# Patient Record
Sex: FEMALE | Race: WHITE | Employment: PART TIME | URBAN - METROPOLITAN AREA
[De-identification: names, ages, dates, MRNs, and addresses within clinical notes are randomized per-mention and may not be internally consistent; named-entity substitution may affect disease eponyms.]

---

## 2020-08-10 ENCOUNTER — OFFICE VISIT (OUTPATIENT)
Dept: CARDIOLOGY CLINIC | Age: 50
End: 2020-08-10
Payer: COMMERCIAL

## 2020-08-10 VITALS
HEIGHT: 66 IN | DIASTOLIC BLOOD PRESSURE: 82 MMHG | RESPIRATION RATE: 22 BRPM | SYSTOLIC BLOOD PRESSURE: 122 MMHG | OXYGEN SATURATION: 100 % | BODY MASS INDEX: 22.79 KG/M2 | HEART RATE: 71 BPM | WEIGHT: 141.8 LBS

## 2020-08-10 PROCEDURE — 99244 OFF/OP CNSLTJ NEW/EST MOD 40: CPT | Performed by: INTERNAL MEDICINE

## 2020-08-10 RX ORDER — LEVOTHYROXINE SODIUM 0.12 MG/1
TABLET ORAL
COMMUNITY
Start: 2020-07-20 | End: 2021-03-12 | Stop reason: SDUPTHER

## 2020-08-10 RX ORDER — ALBUTEROL SULFATE 90 UG/1
AEROSOL, METERED RESPIRATORY (INHALATION)
COMMUNITY
Start: 2020-08-03 | End: 2021-03-12 | Stop reason: SDUPTHER

## 2020-08-10 RX ORDER — METOPROLOL SUCCINATE 25 MG/1
25 TABLET, EXTENDED RELEASE ORAL DAILY
Qty: 30 TABLET | Refills: 5 | Status: ON HOLD | OUTPATIENT
Start: 2020-08-10 | End: 2021-01-15

## 2020-08-10 RX ORDER — ASPIRIN 81 MG/1
81 TABLET ORAL DAILY
COMMUNITY
End: 2020-12-14 | Stop reason: ALTCHOICE

## 2020-08-10 ASSESSMENT — ENCOUNTER SYMPTOMS
VOICE CHANGE: 0
ANAL BLEEDING: 0
CHEST TIGHTNESS: 0
COLOR CHANGE: 0
BLOOD IN STOOL: 0
APNEA: 0
ABDOMINAL DISTENTION: 0
WHEEZING: 0
DIARRHEA: 0
SHORTNESS OF BREATH: 0
VOMITING: 0
TROUBLE SWALLOWING: 0
NAUSEA: 0
FACIAL SWELLING: 0

## 2020-08-10 NOTE — PROGRESS NOTES
[facility] CARDIOLOGY OFFICE CONSULT        Patient: Lennox Heimlich  YOB: 1970  MRN: [id number]    Chief Complaint:  Chief Complaint   Patient presents with   Jacquelin Castro Cardiologist     Referred by Dr. Terence Phelan from [address] [employer]    Abnormal Test Results     ABN EKG       Subjective/HPI:   8/10/2020: Patient presents today for evaluation of dizziness and reportedly abnormal EKG. 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 She works for the [employer]. Physically very active. . No children. 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 But has not been doing any for the last 6 months because of COVID has been getting the shaky spells and dizziness. Never had syncope. No headaches no seizures no premonitory symptoms. No definite relationship to posture. She golf's occasionally. Does not smoke. Drinks 1 cup of coffee and 1 glass of red wine occasionally. No illicit drug abuse. She says her  complains of her snoring. Still has regular menstrual periods. I think she has mild vasovagal symptoms. Start her on Toprol 25 mg at night we will set up for stress Cardiolite echo and then see me in the [facility] office. She lives in [place]              No past medical history on file. No past surgical history on file. No family history on file.     Social History     Socioeconomic History    Marital status:      Spouse name: None    Number of children: None    Years of education: None    Highest education level: None   Occupational History    None   Social Needs    Financial resource strain: None    Food insecurity     Worry: None     Inability: None    Transportation needs     Medical: None     Non-medical: None   Tobacco Use    Smoking status: Current Some Day Smoker    Smokeless tobacco: Never Used   Substance and Sexual Activity    Alcohol use: Not Currently     Comment: occass    Drug use: Never    Sexual activity: Not Currently   Lifestyle    Physical activity     Days per week: None     Minutes per session: None    Stress: None   Relationships    Social connections     Talks on phone: None     Gets together: None     Attends Sikhism service: None     Active member of club or organization: None     Attends meetings of clubs or organizations: None     Relationship status: None    Intimate partner violence     Fear of current or ex partner: None     Emotionally abused: None     Physically abused: None     Forced sexual activity: None   Other Topics Concern    None   Social History Narrative    None       Allergies   Allergen Reactions    Azithromycin Other (See Comments)     BRUISING    Clarithromycin      Other reaction(s): Unknown  Causes Bruises    Molds & Smuts      Other reaction(s): Intolerance    Seasonal      Other reaction(s): Intolerance       Current Outpatient Medications   Medication Sig Dispense Refill    levothyroxine (SYNTHROID) 125 MCG tablet Take 1 tablet by mouth once daily.  albuterol sulfate  (90 Base) MCG/ACT inhaler INHALE 2 (TWO) puffs EVERY 4 HOURS AS NEEDED      Multiple Vitamin (MVI, CELEBRATE, CHEWABLE TABLET) Take 1 tablet by mouth daily      aspirin EC 81 MG EC tablet Take 81 mg by mouth daily      metoprolol succinate (TOPROL XL) 25 MG extended release tablet Take 1 tablet by mouth daily 30 tablet 5     No current facility-administered medications for this visit. Review of Systems:   Review of Systems   Constitutional: Negative for activity change, appetite change, diaphoresis, fatigue and unexpected weight change. HENT: Negative for facial swelling, nosebleeds, trouble swallowing and voice change. Respiratory: Negative for apnea, chest tightness, shortness of breath and wheezing. Cardiovascular: Negative for chest pain, palpitations and leg swelling. Gastrointestinal: Negative for abdominal distention, anal bleeding, blood in stool, diarrhea, nausea and vomiting.    Genitourinary: Negative for decreased urine volume and dysuria. Musculoskeletal: Negative for gait problem, myalgias, neck pain and neck stiffness. Skin: Negative for color change, pallor, rash and wound. Neurological: Negative for dizziness, seizures, syncope, facial asymmetry, weakness, light-headedness, numbness and headaches. Hematological: Does not bruise/bleed easily. Psychiatric/Behavioral: Negative for agitation, behavioral problems, confusion, hallucinations and suicidal ideas. The patient is not nervous/anxious. All other systems reviewed and are negative. Review of System is negative except for as mentioned above. Physical Examination:    /82 (Site: Right Upper Arm, Position: Sitting, Cuff Size: Medium Adult)   Pulse 71   Resp 22   Ht 5' 6\" (1.676 m)   Wt 141 lb 12.8 oz (64.3 kg)   SpO2 100%   BMI 22.89 kg/m²    Physical Exam   Constitutional: She appears healthy. No distress. HENT:   Nose: Nose normal.   Mouth/Throat: Dentition is normal. Oropharynx is clear. Eyes: Pupils are equal, round, and reactive to light. Conjunctivae are normal.   Neck: Normal range of motion and thyroid normal. Neck supple. Cardiovascular: Regular rhythm, S1 normal, S2 normal, normal heart sounds, intact distal pulses and normal pulses. PMI is not displaced. No murmur heard. Pulmonary/Chest: She has no wheezes. She has no rales. She exhibits no tenderness. Abdominal: Soft. Bowel sounds are normal. She exhibits no distension and no mass. There is no splenomegaly or hepatomegaly. There is no abdominal tenderness. No hernia. Neurological: She is alert and oriented to person, place, and time. She has normal motor skills. Gait normal.   Skin: Skin is warm and dry. No cyanosis. No jaundice. Nails show no clubbing. There is no problem list on file for this patient.         Orders Placed This Encounter   Procedures    NM Myocardial Spect Rest Exercise or Rx     Standing Status:   Future     Standing Expiration Date: 8/10/2021     Scheduling Instructions:      TO BE DONE AT [facility]     Order Specific Question:   Reason for Exam?     Answer:   Angina     Order Specific Question:   Procedure Type     Answer:   Exercise     Order Specific Question:   Reason for exam:     Answer:   ANGINA    Echo 2D w doppler w color complete     Standing Status:   Future     Standing Expiration Date:   8/10/2021     Scheduling Instructions:      TO BE DONE AT [facility] ASAP     Order Specific Question:   Reason for exam:     Answer:   ANGINA           Orders Placed This Encounter   Medications    metoprolol succinate (TOPROL XL) 25 MG extended release tablet     Sig: Take 1 tablet by mouth daily     Dispense:  30 tablet     Refill:  5           Assessment:    1. Angina at rest [facility])         Plan:   Patient to have Echo and Stress Cardiolite test done. Started on Toprol 25mg Nightly. Stay on same medications. See me in 2-3 weeks after testing. This note was partially generated using Dragon voice recognition system, and there may be some incorrect words, spellings, punctuation that were not noticed in checking the note before saving.         Electronically signed by Caio Arriaza MD on 8/10/2020 at 1:30 PM

## 2020-08-31 ENCOUNTER — APPOINTMENT (OUTPATIENT)
Dept: NON INVASIVE DIAGNOSTICS | Age: 50
End: 2020-08-31
Payer: COMMERCIAL

## 2020-08-31 ENCOUNTER — HOSPITAL ENCOUNTER (OUTPATIENT)
Dept: NON INVASIVE DIAGNOSTICS | Age: 50
Discharge: HOME | End: 2020-08-31
Payer: COMMERCIAL

## 2020-08-31 ENCOUNTER — HOSPITAL ENCOUNTER (OUTPATIENT)
Dept: NUCLEAR MEDICINE | Age: 50
Discharge: HOME | End: 2020-09-02
Payer: COMMERCIAL

## 2020-08-31 PROCEDURE — 93017 CV STRESS TEST TRACING ONLY: CPT

## 2020-08-31 PROCEDURE — 3430000000 HC RX DIAGNOSTIC RADIOPHARMACEUTICAL: Performed by: INTERNAL MEDICINE

## 2020-08-31 PROCEDURE — A9502 TC99M TETROFOSMIN: HCPCS | Performed by: INTERNAL MEDICINE

## 2020-08-31 PROCEDURE — 2580000003 HC RX 258: Performed by: INTERNAL MEDICINE

## 2020-08-31 PROCEDURE — 78452 HT MUSCLE IMAGE SPECT MULT: CPT

## 2020-08-31 RX ORDER — SODIUM CHLORIDE 0.9 % (FLUSH) 0.9 %
10 SYRINGE (ML) INJECTION PRN
Status: DISCONTINUED | OUTPATIENT
Start: 2020-08-31 | End: 2020-09-03 | Stop reason: HOSPADM

## 2020-08-31 RX ADMIN — TETROFOSMIN 11.7 MILLICURIE: 1.38 INJECTION, POWDER, LYOPHILIZED, FOR SOLUTION INTRAVENOUS at 08:33

## 2020-08-31 RX ADMIN — Medication 10 ML: at 09:57

## 2020-08-31 RX ADMIN — TETROFOSMIN 31.8 MILLICURIE: 1.38 INJECTION, POWDER, LYOPHILIZED, FOR SOLUTION INTRAVENOUS at 09:56

## 2020-08-31 RX ADMIN — Medication 10 ML: at 08:33

## 2020-08-31 NOTE — PROGRESS NOTES
Hx,allergies and medications reviewed. Jada 93 here. Injected patient with isotope and Myoview. Tolerated procedure very well. Reached 87 % target HR. SOB reported. Returned to baseline in recovery. Denied chest pain or pressure. EKG shows occasional pvc's and noted t wave abnormality on initial ekg.

## 2020-09-02 NOTE — PROCEDURES
[address]                      [address], [address] [facility]                              CARDIAC STRESS TEST    PATIENT NAME: Manasa Carroll                      :        1970  MED REC NO:   [id number]                            ROOM:  ACCOUNT NO:   [de-identified]                           ADMIT DATE: 2020  PROVIDER:     James Dickson DO    CARDIOVASCULAR DIAGNOSTIC DEPARTMENT    DATE OF STUDY:  2020    ONE-DAY TREADMILL MYOCARDIAL PERFUSION STRESS TEST    ORDERING PROVIDER:  Tariq Estrada MD    PRIMARY CARE PROVIDER:  Vlad Miles MD    REASON FOR EXAM:  Angina. EXAM TYPE:  Stress Myocardial Perfusion Treadmill, 1-day. PROCEDURE DESCRIPTION:  The patient was injected intravenously at rest  with technetium-99m tetrofosmin followed by resting SPECT myocardial  perfusion imaging and then underwent stress protocol on a treadmill. The patient was then injected intravenously at maximal exercise with  technetium-99m tetrofosmin and SPECT myocardial perfusion and gated  imaging were repeated. Rest dose:  11.7 mCi  Stress dose:  31.8 mCi    FINDINGS:  The stress and rest images exhibit homogeneous uptake of  tracer throughout the left ventricular myocardium. There is no evidence  of stress-induced reversible perfusion abnormalities to suggest  myocardial ischemia. Gated imaging exhibits normal left ventricular  size and normal wall motion and myocardial thickening. The patient did  not develop any symptoms other than fatigue during the procedure. LVEF:  75%  TID ratio:  1.02    The resting EKG is abnormal with sinus rhythm and poor R-wave  progression across the precordial leads. There are ST depressions in  the inferior leads as well as anteroseptal myocardial infarction of  questionable age indeterminate. IMPRESSION:  Abnormal resting EKG.         Jennifer Gonzalez DO    D: 2020 #8:00:45       T: 2020 8:19:04

## 2020-09-14 ENCOUNTER — OFFICE VISIT (OUTPATIENT)
Dept: CARDIOLOGY CLINIC | Age: 50
End: 2020-09-14
Payer: COMMERCIAL

## 2020-09-14 VITALS
WEIGHT: 144.6 LBS | OXYGEN SATURATION: 99 % | BODY MASS INDEX: 23.24 KG/M2 | HEIGHT: 66 IN | SYSTOLIC BLOOD PRESSURE: 122 MMHG | HEART RATE: 82 BPM | RESPIRATION RATE: 22 BRPM | DIASTOLIC BLOOD PRESSURE: 80 MMHG

## 2020-09-14 DIAGNOSIS — Z00.00 WELL ADULT HEALTH CHECK: ICD-10-CM

## 2020-09-14 LAB
BASOPHILS ABSOLUTE: 0 K/UL (ref 0–0.2)
BASOPHILS RELATIVE PERCENT: 0.6 %
EOSINOPHILS ABSOLUTE: 0 K/UL (ref 0–0.7)
EOSINOPHILS RELATIVE PERCENT: 0.3 %
FOLLICLE STIMULATING HORMONE: 38 MIU/ML
HCT VFR BLD CALC: 42.5 % (ref 37–47)
HEMOGLOBIN: 14 G/DL (ref 12–16)
LUTEINIZING HORMONE: 42.6 MIU/ML
LYMPHOCYTES ABSOLUTE: 2.9 K/UL (ref 1–4.8)
LYMPHOCYTES RELATIVE PERCENT: 36 %
MCH RBC QN AUTO: 29.2 PG (ref 27–31.3)
MCHC RBC AUTO-ENTMCNC: 33 % (ref 33–37)
MCV RBC AUTO: 88.7 FL (ref 82–100)
MONOCYTES ABSOLUTE: 0.5 K/UL (ref 0.2–0.8)
MONOCYTES RELATIVE PERCENT: 6.8 %
NEUTROPHILS ABSOLUTE: 4.5 K/UL (ref 1.4–6.5)
NEUTROPHILS RELATIVE PERCENT: 56.3 %
PDW BLD-RTO: 13.8 % (ref 11.5–14.5)
PLATELET # BLD: 312 K/UL (ref 130–400)
PROLACTIN: 10.9 NG/ML
RBC # BLD: 4.79 M/UL (ref 4.2–5.4)
T3 TOTAL: 0.84 NG/ML (ref 0.8–2)
T4 TOTAL: 8.9 UG/DL (ref 4.5–11.7)
TSH SERPL DL<=0.05 MIU/L-ACNC: 2.44 UIU/ML (ref 0.44–3.86)
WBC # BLD: 7.9 K/UL (ref 4.8–10.8)

## 2020-09-14 PROCEDURE — 99213 OFFICE O/P EST LOW 20 MIN: CPT | Performed by: INTERNAL MEDICINE

## 2020-09-14 ASSESSMENT — ENCOUNTER SYMPTOMS
CHEST TIGHTNESS: 0
BLOOD IN STOOL: 0
APNEA: 0
SHORTNESS OF BREATH: 0
NAUSEA: 0
DIARRHEA: 0
VOMITING: 0

## 2020-09-14 NOTE — PROGRESS NOTES
status: Current Some Day Smoker    Smokeless tobacco: Never Used   Substance and Sexual Activity    Alcohol use: Not Currently     Comment: occass    Drug use: Never    Sexual activity: Not Currently   Lifestyle    Physical activity     Days per week: None     Minutes per session: None    Stress: None   Relationships    Social connections     Talks on phone: None     Gets together: None     Attends Orthodox service: None     Active member of club or organization: None     Attends meetings of clubs or organizations: None     Relationship status: None    Intimate partner violence     Fear of current or ex partner: None     Emotionally abused: None     Physically abused: None     Forced sexual activity: None   Other Topics Concern    None   Social History Narrative    None       Allergies   Allergen Reactions    Azithromycin Other (See Comments)     BRUISING    Clarithromycin      Other reaction(s): Unknown  Causes Bruises    Molds & Smuts      Other reaction(s): Intolerance    Seasonal      Other reaction(s): Intolerance       Current Outpatient Medications   Medication Sig Dispense Refill    levothyroxine (SYNTHROID) 125 MCG tablet Take 1 tablet by mouth once daily.  albuterol sulfate  (90 Base) MCG/ACT inhaler INHALE 2 (TWO) puffs EVERY 4 HOURS AS NEEDED      Multiple Vitamin (MVI, CELEBRATE, CHEWABLE TABLET) Take 1 tablet by mouth daily      aspirin EC 81 MG EC tablet Take 81 mg by mouth daily      metoprolol succinate (TOPROL XL) 25 MG extended release tablet Take 1 tablet by mouth daily 30 tablet 5     No current facility-administered medications for this visit. Review of Systems:   Review of Systems   Constitutional: Negative for appetite change, diaphoresis and fatigue. HENT: Negative for nosebleeds. Respiratory: Negative for apnea, chest tightness and shortness of breath. Cardiovascular: Negative for chest pain, palpitations and leg swelling.    Gastrointestinal: Negative for blood in stool, diarrhea, nausea and vomiting. Musculoskeletal: Negative for myalgias, neck pain and neck stiffness. Skin: Negative for color change, pallor, rash and wound. Neurological: Negative for dizziness, seizures, syncope, weakness, light-headedness, numbness and headaches. Hematological: Does not bruise/bleed easily. Psychiatric/Behavioral: Negative for agitation, behavioral problems and confusion. The patient is not nervous/anxious and is not hyperactive. All other systems reviewed and are negative. Review of System is negative except for as mentioned above. Physical Examination:    /80 (Site: Right Upper Arm, Position: Sitting, Cuff Size: Medium Adult)   Pulse 82   Resp 22   Ht 5' 6\" (1.676 m)   Wt 144 lb 9.6 oz (65.6 kg)   LMP 09/12/2020 (Approximate)   SpO2 99%   BMI 23.34 kg/m²    Physical Exam   Constitutional: She appears healthy. HENT:   Nose: Nose normal.   Mouth/Throat: Dentition is normal. Oropharynx is clear. Eyes: Pupils are equal, round, and reactive to light. Neck: Normal range of motion. Cardiovascular: Normal rate, regular rhythm, S1 normal, S2 normal, normal heart sounds, intact distal pulses and normal pulses. No extrasystoles are present. Exam reveals no gallop. No murmur heard. Pulmonary/Chest: Effort normal and breath sounds normal. She has no wheezes. She has no rales. She exhibits no tenderness. Abdominal: Soft. Bowel sounds are normal. She exhibits no distension and no mass. There is no splenomegaly or hepatomegaly. There is no abdominal tenderness. Musculoskeletal: Normal range of motion. General: No tenderness, deformity or edema. Neurological: She is alert and oriented to person, place, and time. She has normal motor skills and normal reflexes. Gait normal.   Skin: Skin is warm and dry. There is no problem list on file for this patient.           Orders Placed This Encounter   Procedures    CBC Auto Differential     Standing Status:   Future     Number of Occurrences:   1     Standing Expiration Date:   9/14/2021    T3     Standing Status:   Future     Number of Occurrences:   1     Standing Expiration Date:   9/14/2021    T4     Standing Status:   Future     Number of Occurrences:   1     Standing Expiration Date:   9/14/2021    TSH Without Reflex     Standing Status:   Future     Number of Occurrences:   1     Standing Expiration Date:   9/14/2021    Prolactin     Standing Status:   Future     Number of Occurrences:   1     Standing Expiration Date:   9/57/4095    Follicle Stimulating Hormone     Standing Status:   Future     Number of Occurrences:   1     Standing Expiration Date:   9/14/2021    Luteinizing Hormone     Standing Status:   Future     Number of Occurrences:   1     Standing Expiration Date:   9/14/2021         No orders of the defined types were placed in this encounter. Assessment:    1. Well adult health check    2. Angina at rest [facility])         Plan:     Patient to have Arterial Doppler ultrasound of both legs. Need labs drawn for CBC, FSH, LH,PROLACTIN, TSH, T3, T4. Will HOLD Toprol at this time until sees me again. Stay on same medications. See me in 5-7 weeks. This note was partially generated using Dragon voice recognition system, and there may be some incorrect words, spellings, punctuation that were not noticed in checking the note before saving.         Electronically signed by Maryann Del Toro MD on 9/16/2020 at 10:53 AM

## 2020-09-16 ASSESSMENT — ENCOUNTER SYMPTOMS: COLOR CHANGE: 0

## 2020-10-05 ENCOUNTER — TELEPHONE (OUTPATIENT)
Dept: CARDIOLOGY CLINIC | Age: 50
End: 2020-10-05

## 2020-10-19 ENCOUNTER — OFFICE VISIT (OUTPATIENT)
Dept: CARDIOLOGY CLINIC | Age: 50
End: 2020-10-19
Payer: COMMERCIAL

## 2020-10-19 VITALS
SYSTOLIC BLOOD PRESSURE: 122 MMHG | BODY MASS INDEX: 23.24 KG/M2 | DIASTOLIC BLOOD PRESSURE: 80 MMHG | RESPIRATION RATE: 22 BRPM | WEIGHT: 144.6 LBS | HEIGHT: 66 IN | HEART RATE: 87 BPM | OXYGEN SATURATION: 99 %

## 2020-10-19 PROCEDURE — 90686 IIV4 VACC NO PRSV 0.5 ML IM: CPT | Performed by: INTERNAL MEDICINE

## 2020-10-19 PROCEDURE — 90471 IMMUNIZATION ADMIN: CPT | Performed by: INTERNAL MEDICINE

## 2020-10-19 PROCEDURE — 99214 OFFICE O/P EST MOD 30 MIN: CPT | Performed by: INTERNAL MEDICINE

## 2020-10-19 ASSESSMENT — ENCOUNTER SYMPTOMS
CHEST TIGHTNESS: 0
STRIDOR: 0
NAUSEA: 0
APNEA: 0
VOMITING: 0
COUGH: 0
WHEEZING: 0
SHORTNESS OF BREATH: 0
BLOOD IN STOOL: 0
DIARRHEA: 0

## 2020-10-19 NOTE — PROGRESS NOTES
[facility] CARDIOLOGY OFFICE FOLLOW-UP      Patient: Ivette Mccoy  YOB: 1970  MRN: [id number]    Chief Complaint:  Chief Complaint   Patient presents with    1 Month Follow-Up    Chest Pain    Discuss Labs         Subjective/HPI:  10/19/2020: Patient presents today for follow-up of vague chest discomfort. Stress is negative. She works for the [employer]. Also teaches exercise classes. She had series of blood tests done which were ordered by me. I am going to set her up with . She has not had a good female exam.  Needs complete checkup. She we will see her next week. See me in 3 months       9/14/2020: Patient presents today for follow-up of dizziness. She still feels little bit weak. She is having her menstrual.  And says that usually happens around the time. Stress echo was negative. I went to order a series of blood tests to check hormones. See me after     8/10/2020: Patient presents today for evaluation of dizziness and reportedly abnormal EKG. 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  She works for the [employer]. [employer] very active. [place] Jose.  No children.  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 Khadijah Huerta has not been doing any for the last 6 months because of COVID has been getting the shaky spells and dizziness. Never had syncope.  No headaches no seizures no premonitory symptoms.  No definite relationship to posture.  She golf's occasionally.  Does not smoke.  Drinks 1 cup of coffee and 1 glass of red wine occasionally.  No illicit drug abuse.  She says her  complains of her snoring.  Still has regular menstrual periods.  I think she has mild vasovagal symptoms.  Start her on Toprol 25 mg at night we will set up for stress Cardiolite echo and then see me in the [facility] office.  She lives in [place]        No past medical history on file. No past surgical history on file. No family history on file.     Social History     Socioeconomic History    Marital status:  No current facility-administered medications for this visit. Review of Systems:   Review of Systems   Constitutional: Negative for diaphoresis and fatigue. HENT: Negative for nosebleeds. Respiratory: Negative for apnea, cough, chest tightness, shortness of breath, wheezing and stridor. Cardiovascular: Negative for chest pain, palpitations and leg swelling. Gastrointestinal: Negative for blood in stool, diarrhea, nausea and vomiting. Musculoskeletal: Negative for myalgias, neck pain and neck stiffness. Neurological: Negative for dizziness, seizures, syncope, weakness, light-headedness, numbness and headaches. Hematological: Does not bruise/bleed easily. Psychiatric/Behavioral: Negative for confusion and suicidal ideas. The patient is not nervous/anxious. All other systems reviewed and are negative. Review of System is negative except for as mentioned above. Physical Examination:    /80 (Site: Right Upper Arm, Position: Sitting, Cuff Size: Medium Adult)   Pulse 87   Resp 22   Ht 5' 6\" (1.676 m)   Wt 144 lb 9.6 oz (65.6 kg)   LMP 10/10/2020 (Approximate)   SpO2 99%   BMI 23.34 kg/m²    Physical Exam   Constitutional: She appears healthy. No distress. HENT:   Nose: Nose normal.   Mouth/Throat: Dentition is normal. Oropharynx is clear. Eyes: Pupils are equal, round, and reactive to light. Conjunctivae are normal.   Neck: Normal range of motion and thyroid normal. Neck supple. Cardiovascular: Regular rhythm, S1 normal, S2 normal, normal heart sounds, intact distal pulses and normal pulses. PMI is not displaced. No murmur heard. Pulmonary/Chest: She has no wheezes. She has no rales. She exhibits no tenderness. Abdominal: Soft. Bowel sounds are normal. She exhibits no distension and no mass. There is no splenomegaly or hepatomegaly. There is no abdominal tenderness. No hernia. Neurological: She is alert and oriented to person, place, and time.  She has normal motor skills. Gait normal.   Skin: Skin is warm and dry. No cyanosis. No jaundice. Nails show no clubbing. There is no problem list on file for this patient. Orders Placed This Encounter   Procedures    INFLUENZA, QUADV, 3 YRS AND OLDER, IM PF, PREFILL SYR OR SDV, 0.5ML (Gerhardt Bowling, PF)   Karthik Connolly MD, Primary Care, [facility]     Referral Priority:   Routine     Referral Type:   Eval and Treat     Referral Reason:   Specialty Services Required     Referred to Provider: Cailin Jay MD     Requested Specialty:   Internal Medicine     Number of Visits Requested:   1         No orders of the defined types were placed in this encounter. Assessment:    1. Encounter to establish care with new doctor    2. Need for influenza vaccination    3. Chest pain, unspecified          Plan:   Referred to Dr. Cailin Jay at [facility] PCP    Gave Influenza vaccine    Stay on same medications. See me in 3 months. This note was partially generated using Dragon voice recognition system, and there may be some incorrect words, spellings, punctuation that were not noticed in checking the note before saving.         Electronically signed by Howard Walters MD on 10/20/2020 at 9:29 AM

## 2020-10-26 ENCOUNTER — OFFICE VISIT (OUTPATIENT)
Dept: PRIMARY CARE CLINIC | Age: 50
End: 2020-10-26
Payer: COMMERCIAL

## 2020-10-26 VITALS
WEIGHT: 145 LBS | DIASTOLIC BLOOD PRESSURE: 74 MMHG | OXYGEN SATURATION: 98 % | HEART RATE: 76 BPM | RESPIRATION RATE: 16 BRPM | BODY MASS INDEX: 23.3 KG/M2 | HEIGHT: 66 IN | SYSTOLIC BLOOD PRESSURE: 124 MMHG

## 2020-10-26 DIAGNOSIS — R42 VERTIGO: ICD-10-CM

## 2020-10-26 DIAGNOSIS — N95.1 PERIMENOPAUSAL: ICD-10-CM

## 2020-10-26 LAB
ESTRADIOL LEVEL: 126 PG/ML
VITAMIN B-12: 598 PG/ML (ref 232–1245)

## 2020-10-26 PROCEDURE — 99205 OFFICE O/P NEW HI 60 MIN: CPT | Performed by: INTERNAL MEDICINE

## 2020-10-26 RX ORDER — MECLIZINE HYDROCHLORIDE 25 MG/1
25 TABLET ORAL 3 TIMES DAILY PRN
Qty: 30 TABLET | Refills: 0 | Status: SHIPPED | OUTPATIENT
Start: 2020-10-26 | End: 2020-12-14 | Stop reason: ALTCHOICE

## 2020-10-26 RX ORDER — FLUTICASONE PROPIONATE 50 MCG
2 SPRAY, SUSPENSION (ML) NASAL DAILY
Qty: 1 BOTTLE | Refills: 0 | Status: SHIPPED | OUTPATIENT
Start: 2020-10-26

## 2020-10-26 RX ORDER — AMOXICILLIN 500 MG/1
500 CAPSULE ORAL 3 TIMES DAILY
Qty: 21 CAPSULE | Refills: 0 | Status: SHIPPED | OUTPATIENT
Start: 2020-10-26 | End: 2020-11-02

## 2020-10-26 SDOH — ECONOMIC STABILITY: TRANSPORTATION INSECURITY
IN THE PAST 12 MONTHS, HAS THE LACK OF TRANSPORTATION KEPT YOU FROM MEDICAL APPOINTMENTS OR FROM GETTING MEDICATIONS?: NO

## 2020-10-26 SDOH — ECONOMIC STABILITY: INCOME INSECURITY: HOW HARD IS IT FOR YOU TO PAY FOR THE VERY BASICS LIKE FOOD, HOUSING, MEDICAL CARE, AND HEATING?: SOMEWHAT HARD

## 2020-10-26 SDOH — ECONOMIC STABILITY: FOOD INSECURITY: WITHIN THE PAST 12 MONTHS, THE FOOD YOU BOUGHT JUST DIDN'T LAST AND YOU DIDN'T HAVE MONEY TO GET MORE.: NEVER TRUE

## 2020-10-26 SDOH — ECONOMIC STABILITY: FOOD INSECURITY: WITHIN THE PAST 12 MONTHS, YOU WORRIED THAT YOUR FOOD WOULD RUN OUT BEFORE YOU GOT MONEY TO BUY MORE.: NEVER TRUE

## 2020-10-26 SDOH — ECONOMIC STABILITY: TRANSPORTATION INSECURITY
IN THE PAST 12 MONTHS, HAS LACK OF TRANSPORTATION KEPT YOU FROM MEETINGS, WORK, OR FROM GETTING THINGS NEEDED FOR DAILY LIVING?: NO

## 2020-10-26 ASSESSMENT — PATIENT HEALTH QUESTIONNAIRE - PHQ9
2. FEELING DOWN, DEPRESSED OR HOPELESS: 0
1. LITTLE INTEREST OR PLEASURE IN DOING THINGS: 0
SUM OF ALL RESPONSES TO PHQ QUESTIONS 1-9: 0
SUM OF ALL RESPONSES TO PHQ9 QUESTIONS 1 & 2: 0

## 2020-10-26 NOTE — PROGRESS NOTES
week: Not on file     Minutes per session: Not on file    Stress: Not on file   Relationships    Social connections     Talks on phone: Not on file     Gets together: Not on file     Attends Yarsanism service: Not on file     Active member of club or organization: Not on file     Attends meetings of clubs or organizations: Not on file     Relationship status: Not on file    Intimate partner violence     Fear of current or ex partner: Not on file     Emotionally abused: Not on file     Physically abused: Not on file     Forced sexual activity: Not on file   Other Topics Concern    Not on file   Social History Narrative    Not on file     History reviewed. No pertinent family history. Allergies:  Azithromycin; Clarithromycin; Molds & smuts; and Seasonal  There is no problem list on file for this patient. Current Outpatient Medications on File Prior to Visit   Medication Sig Dispense Refill    levothyroxine (SYNTHROID) 125 MCG tablet Take 1 tablet by mouth once daily.  albuterol sulfate  (90 Base) MCG/ACT inhaler INHALE 2 (TWO) puffs EVERY 4 HOURS AS NEEDED      Multiple Vitamin (MVI, CELEBRATE, CHEWABLE TABLET) Take 1 tablet by mouth daily      aspirin EC 81 MG EC tablet Take 81 mg by mouth daily      metoprolol succinate (TOPROL XL) 25 MG extended release tablet Take 1 tablet by mouth daily (Patient not taking: Reported on 10/26/2020) 30 tablet 5     No current facility-administered medications on file prior to visit. Review of Systems   Constitutional: Negative for chills, diaphoresis, fatigue and fever. HENT: Positive for sinus pressure. Negative for congestion, ear discharge, ear pain, rhinorrhea, sinus pain, sneezing and sore throat. Respiratory: Positive for cough. Negative for shortness of breath and wheezing. Cardiovascular: Negative for chest pain. Gastrointestinal: Negative for abdominal pain, diarrhea, nausea and vomiting.    Endocrine: Negative for cold intolerance and heat intolerance. Genitourinary: Negative for dysuria and frequency. Neurological: Positive for dizziness and headaches. Negative for facial asymmetry, speech difficulty, weakness and light-headedness. Psychiatric/Behavioral: Positive for sleep disturbance. Negative for dysphoric mood and suicidal ideas. The patient is not nervous/anxious. Objective:   /74 (Site: Left Upper Arm, Position: Sitting, Cuff Size: Medium Adult)   Pulse 76   Resp 16   Ht 5' 6\" (1.676 m)   Wt 145 lb (65.8 kg)   LMP 10/10/2020 (Approximate)   SpO2 98%   BMI 23.40 kg/m²     Physical Exam  Constitutional:       General: She is not in acute distress. Appearance: Normal appearance. She is well-developed. Cardiovascular:      Rate and Rhythm: Normal rate and regular rhythm. Pulses: Normal pulses. Heart sounds: Normal heart sounds. No murmur. Pulmonary:      Effort: Pulmonary effort is normal. No respiratory distress. Breath sounds: Normal breath sounds. No wheezing. Abdominal:      General: Bowel sounds are normal. There is no distension. Palpations: Abdomen is soft. There is no mass. Tenderness: There is no abdominal tenderness. There is no guarding or rebound. Neurological:      Mental Status: She is alert. Psychiatric:         Mood and Affect: Mood normal.       Assessment:       Diagnosis Orders   1. Vertigo  Vitamin B12    Methylmalonic Acid, Serum    meclizine (ANTIVERT) 25 MG tablet   2. Chronic sinusitis, unspecified location  fluticasone (FLONASE) 50 MCG/ACT nasal spray    amoxicillin (AMOXIL) 500 MG capsule   3. Family history of colon cancer  Tara Morrison MD, Gastroenterology, [place]   4. Insomnia, unspecified type  doxyLAMINE succinate (GNP SLEEP AID) 25 MG tablet   5. Breast cancer screening by mammogram  CARI DIGITAL SCREEN W OR WO CAD BILATERAL   6. Perimenopausal  Estradiol   7.  Screening for colon cancer  Isaac Blankenship MD, Gastroenterology, [place]

## 2020-10-27 ASSESSMENT — ENCOUNTER SYMPTOMS
DIARRHEA: 0
SORE THROAT: 0
COUGH: 1
ABDOMINAL PAIN: 0
SHORTNESS OF BREATH: 0
SINUS PRESSURE: 1
VOMITING: 0
NAUSEA: 0
RHINORRHEA: 0
SINUS PAIN: 0
WHEEZING: 0

## 2020-10-30 LAB — METHYLMALONIC ACID: 0.15 UMOL/L (ref 0–0.4)

## 2020-11-09 ENCOUNTER — OFFICE VISIT (OUTPATIENT)
Dept: PRIMARY CARE CLINIC | Age: 50
End: 2020-11-09
Payer: COMMERCIAL

## 2020-11-09 VITALS
DIASTOLIC BLOOD PRESSURE: 74 MMHG | OXYGEN SATURATION: 98 % | BODY MASS INDEX: 22.63 KG/M2 | HEART RATE: 76 BPM | WEIGHT: 144.2 LBS | RESPIRATION RATE: 16 BRPM | HEIGHT: 67 IN | SYSTOLIC BLOOD PRESSURE: 124 MMHG

## 2020-11-09 DIAGNOSIS — Z01.419 ENCOUNTER FOR GYNECOLOGICAL EXAMINATION: ICD-10-CM

## 2020-11-09 PROCEDURE — 99396 PREV VISIT EST AGE 40-64: CPT | Performed by: INTERNAL MEDICINE

## 2020-11-09 PROCEDURE — 99214 OFFICE O/P EST MOD 30 MIN: CPT | Performed by: INTERNAL MEDICINE

## 2020-11-09 ASSESSMENT — PATIENT HEALTH QUESTIONNAIRE - PHQ9
SUM OF ALL RESPONSES TO PHQ QUESTIONS 1-9: 0
SUM OF ALL RESPONSES TO PHQ9 QUESTIONS 1 & 2: 0
SUM OF ALL RESPONSES TO PHQ QUESTIONS 1-9: 0
SUM OF ALL RESPONSES TO PHQ QUESTIONS 1-9: 0
1. LITTLE INTEREST OR PLEASURE IN DOING THINGS: 0
2. FEELING DOWN, DEPRESSED OR HOPELESS: 0

## 2020-11-09 ASSESSMENT — ENCOUNTER SYMPTOMS
VOMITING: 0
WHEEZING: 0
NAUSEA: 0
SHORTNESS OF BREATH: 0
DIARRHEA: 0
ABDOMINAL PAIN: 0
COUGH: 0

## 2020-11-09 NOTE — PROGRESS NOTES
Subjective:      Patient ID: Deja Martinez is a 51 y.o. female    Pap and breast exams  Left elbow pain x 10 days  HPI   Patient presents for a pap test and breast exam today. Last pap test was at least 8 yrs and showed negative intraepithelial neoplasia with positive HPV and normal colposcopy. Denies hx of breast cancer in self or family. Possible cousin with cervical cancer. No vaginal bleed, discharge or itch. No breast lumps, pain or nipple discharge. Left elbow pain x 10 days   Left throbbing anterior left elbow pain since she tried to catch a falling child in her gym and heard a snap. Pain is relieved with ibuprofen and APAP. Assoc swelling and  inability to use the arm due to pain and weakness. History reviewed. No pertinent past medical history. History reviewed. No pertinent surgical history.   Social History     Socioeconomic History    Marital status:      Spouse name: Not on file    Number of children: Not on file    Years of education: Not on file    Highest education level: Not on file   Occupational History    Not on file   Social Needs    Financial resource strain: Somewhat hard    Food insecurity     Worry: Never true     Inability: Never true    Transportation needs     Medical: No     Non-medical: No   Tobacco Use    Smoking status: Current Some Day Smoker    Smokeless tobacco: Never Used   Substance and Sexual Activity    Alcohol use: Not Currently     Comment: occass    Drug use: Never    Sexual activity: Not Currently   Lifestyle    Physical activity     Days per week: Not on file     Minutes per session: Not on file    Stress: Not on file   Relationships    Social connections     Talks on phone: Not on file     Gets together: Not on file     Attends Bahai service: Not on file     Active member of club or organization: Not on file     Attends meetings of clubs or organizations: Not on file     Relationship status: Not on file    Intimate partner violence Fear of current or ex partner: Not on file     Emotionally abused: Not on file     Physically abused: Not on file     Forced sexual activity: Not on file   Other Topics Concern    Not on file   Social History Narrative    Not on file     History reviewed. No pertinent family history. Allergies:  Azithromycin; Clarithromycin; Molds & smuts; and Seasonal  There is no problem list on file for this patient. Current Outpatient Medications on File Prior to Visit   Medication Sig Dispense Refill    meclizine (ANTIVERT) 25 MG tablet Take 1 tablet by mouth 3 times daily as needed for Dizziness or Nausea 30 tablet 0    fluticasone (FLONASE) 50 MCG/ACT nasal spray 2 sprays by Nasal route daily 1 Bottle 0    doxyLAMINE succinate (GNP SLEEP AID) 25 MG tablet Take 1 tablet by mouth nightly as needed for Sleep 30 tablet 1    levothyroxine (SYNTHROID) 125 MCG tablet Take 1 tablet by mouth once daily.  albuterol sulfate  (90 Base) MCG/ACT inhaler INHALE 2 (TWO) puffs EVERY 4 HOURS AS NEEDED      Multiple Vitamin (MVI, CELEBRATE, CHEWABLE TABLET) Take 1 tablet by mouth daily      aspirin EC 81 MG EC tablet Take 81 mg by mouth daily      metoprolol succinate (TOPROL XL) 25 MG extended release tablet Take 1 tablet by mouth daily 30 tablet 5     No current facility-administered medications on file prior to visit. Review of Systems   Constitutional: Negative for chills, diaphoresis, fatigue and fever. HENT: Negative for congestion. Respiratory: Negative for cough, shortness of breath and wheezing. Cardiovascular: Negative for chest pain. Gastrointestinal: Negative for abdominal pain, diarrhea, nausea and vomiting. Endocrine: Negative for cold intolerance and heat intolerance. Genitourinary: Negative for dysuria and frequency. Musculoskeletal: Positive for arthralgias. Negative for myalgias. Neurological: Negative for dizziness and light-headedness.      Objective:   /74 (Site: Right

## 2020-11-13 LAB
HPV COMMENT: NORMAL
HPV TYPE 16: NOT DETECTED
HPV TYPE 18: NOT DETECTED
HPVOH (OTHER TYPES): NOT DETECTED

## 2020-12-07 ENCOUNTER — OFFICE VISIT (OUTPATIENT)
Dept: PRIMARY CARE CLINIC | Age: 50
End: 2020-12-07
Payer: COMMERCIAL

## 2020-12-07 VITALS
OXYGEN SATURATION: 98 % | SYSTOLIC BLOOD PRESSURE: 120 MMHG | WEIGHT: 149.8 LBS | HEART RATE: 72 BPM | BODY MASS INDEX: 23.51 KG/M2 | HEIGHT: 67 IN | DIASTOLIC BLOOD PRESSURE: 72 MMHG | RESPIRATION RATE: 18 BRPM

## 2020-12-07 PROCEDURE — 99214 OFFICE O/P EST MOD 30 MIN: CPT | Performed by: INTERNAL MEDICINE

## 2020-12-07 ASSESSMENT — ENCOUNTER SYMPTOMS
ABDOMINAL PAIN: 0
NAUSEA: 0
SHORTNESS OF BREATH: 0
VOMITING: 0
COUGH: 0
WHEEZING: 0
DIARRHEA: 0

## 2020-12-07 ASSESSMENT — PATIENT HEALTH QUESTIONNAIRE - PHQ9
SUM OF ALL RESPONSES TO PHQ QUESTIONS 1-9: 0
SUM OF ALL RESPONSES TO PHQ9 QUESTIONS 1 & 2: 0
SUM OF ALL RESPONSES TO PHQ QUESTIONS 1-9: 0
1. LITTLE INTEREST OR PLEASURE IN DOING THINGS: 0
2. FEELING DOWN, DEPRESSED OR HOPELESS: 0
SUM OF ALL RESPONSES TO PHQ QUESTIONS 1-9: 0

## 2020-12-07 NOTE — PROGRESS NOTES
Subjective:      Patient ID: Medina Kowalski is a 51 y.o. female  Follow up for elbow pain   HPI  Pt presents with persistent left elbow pain after an injury 5 weeks ago. Aggravated with movement, weakness persists. MRI denied, but planned for tomorrow. Await ortho appt next week. Pt is hopeful for insurance approval per ortho but loath to cancel MRI tomorrow. History reviewed. No pertinent past medical history. History reviewed. No pertinent surgical history. Social History     Socioeconomic History    Marital status:      Spouse name: Not on file    Number of children: Not on file    Years of education: Not on file    Highest education level: Not on file   Occupational History    Not on file   Social Needs    Financial resource strain: Somewhat hard    Food insecurity     Worry: Never true     Inability: Never true    Transportation needs     Medical: No     Non-medical: No   Tobacco Use    Smoking status: Current Some Day Smoker    Smokeless tobacco: Never Used   Substance and Sexual Activity    Alcohol use: Not Currently     Comment: occass    Drug use: Never    Sexual activity: Not Currently   Lifestyle    Physical activity     Days per week: Not on file     Minutes per session: Not on file    Stress: Not on file   Relationships    Social connections     Talks on phone: Not on file     Gets together: Not on file     Attends Voodoo service: Not on file     Active member of club or organization: Not on file     Attends meetings of clubs or organizations: Not on file     Relationship status: Not on file    Intimate partner violence     Fear of current or ex partner: Not on file     Emotionally abused: Not on file     Physically abused: Not on file     Forced sexual activity: Not on file   Other Topics Concern    Not on file   Social History Narrative    Not on file     History reviewed. No pertinent family history.   Allergies:  Azithromycin; Clarithromycin; Molds & smuts; and Seasonal  There is no problem list on file for this patient. Current Outpatient Medications on File Prior to Visit   Medication Sig Dispense Refill    meclizine (ANTIVERT) 25 MG tablet Take 1 tablet by mouth 3 times daily as needed for Dizziness or Nausea 30 tablet 0    fluticasone (FLONASE) 50 MCG/ACT nasal spray 2 sprays by Nasal route daily 1 Bottle 0    doxyLAMINE succinate (GNP SLEEP AID) 25 MG tablet Take 1 tablet by mouth nightly as needed for Sleep 30 tablet 1    levothyroxine (SYNTHROID) 125 MCG tablet Take 1 tablet by mouth once daily.  albuterol sulfate  (90 Base) MCG/ACT inhaler INHALE 2 (TWO) puffs EVERY 4 HOURS AS NEEDED      Multiple Vitamin (MVI, CELEBRATE, CHEWABLE TABLET) Take 1 tablet by mouth daily      aspirin EC 81 MG EC tablet Take 81 mg by mouth daily      metoprolol succinate (TOPROL XL) 25 MG extended release tablet Take 1 tablet by mouth daily 30 tablet 5     No current facility-administered medications on file prior to visit. Review of Systems   Constitutional: Negative for chills, diaphoresis, fatigue and fever. HENT: Negative for congestion, ear discharge and ear pain. Respiratory: Negative for cough, shortness of breath and wheezing. Cardiovascular: Negative for chest pain. Gastrointestinal: Negative for abdominal pain, diarrhea, nausea and vomiting. Endocrine: Negative for cold intolerance and heat intolerance. Genitourinary: Negative for dysuria and frequency. Musculoskeletal: Positive for arthralgias. Neurological: Negative for dizziness and light-headedness. Psychiatric/Behavioral: Negative for dysphoric mood. The patient is not nervous/anxious.       Objective:   /72 (Site: Right Upper Arm, Position: Sitting, Cuff Size: Medium Adult)   Pulse 72   Resp 18   Ht 5' 7\" (1.702 m)   Wt 149 lb 12.8 oz (67.9 kg)   LMP 11/24/2020 (Approximate)   SpO2 98%   BMI 23.46 kg/m²     Physical Exam  Constitutional:       General: She is not in acute distress. Appearance: Normal appearance. She is well-developed. Cardiovascular:      Rate and Rhythm: Normal rate and regular rhythm. Pulses: Normal pulses. Heart sounds: Normal heart sounds. Pulmonary:      Effort: Pulmonary effort is normal. No respiratory distress. Breath sounds: Normal breath sounds. Abdominal:      General: Bowel sounds are normal. There is no distension. Palpations: Abdomen is soft. There is no mass. Tenderness: There is no abdominal tenderness. There is no guarding or rebound. Musculoskeletal:      Comments: Left elbow in cast and sling      Neurological:      Mental Status: She is alert. Psychiatric:         Mood and Affect: Mood normal.         Behavior: Behavior normal.       Assessment:       Diagnosis Orders   1. Elbow injury, left, initial encounter      await ortho appt    2. Screening for hyperlipidemia  Lipid, Fasting   3. Preventative health care  HIV-1,2 Combo Ag/Ab By [facility], Reflexive Panel     Plan:      Orders Placed This Encounter   Procedures    Lipid, Fasting     Standing Status:   Future     Standing Expiration Date:   12/7/2021    HIV-1,2 Combo Ag/Ab By [facility], Reflexive Panel     Standing Status:   Future     Standing Expiration Date:   12/7/2021     No orders of the defined types were placed in this encounter. Return in about 1 month (around 1/7/2021) for follow up.

## 2020-12-14 ENCOUNTER — OFFICE VISIT (OUTPATIENT)
Dept: ORTHOPEDIC SURGERY | Age: 50
End: 2020-12-14
Payer: COMMERCIAL

## 2020-12-14 ENCOUNTER — HOSPITAL ENCOUNTER (OUTPATIENT)
Dept: ORTHOPEDIC SURGERY | Age: 50
Discharge: HOME | End: 2020-12-16
Payer: COMMERCIAL

## 2020-12-14 VITALS — WEIGHT: 149.8 LBS | BODY MASS INDEX: 23.51 KG/M2 | HEIGHT: 67 IN | TEMPERATURE: 97.6 F

## 2020-12-14 PROCEDURE — L3908 WHO COCK-UP NONMOLDE PRE OTS: HCPCS | Performed by: ORTHOPAEDIC SURGERY

## 2020-12-14 PROCEDURE — 73070 X-RAY EXAM OF ELBOW: CPT | Performed by: ORTHOPAEDIC SURGERY

## 2020-12-14 PROCEDURE — 73070 X-RAY EXAM OF ELBOW: CPT

## 2020-12-14 PROCEDURE — 99203 OFFICE O/P NEW LOW 30 MIN: CPT | Performed by: ORTHOPAEDIC SURGERY

## 2020-12-14 NOTE — PROGRESS NOTES
Subjective:      Patient ID: Pearl Carr is a 53 y.o. female who presents today for:  Chief Complaint   Patient presents with    Elbow Pain     NP-left elbow pain for 6 weeks, pt says that she had her arm out and a kid pulled on it and she heard a ripping sound, no other injury reported, hx of wrist injections years ago, no other surgical hx, pt is right hand dominant; no recent images       HPI  Patient complains of pain in her left elbow most noted with supination activities from the wrist and forearm. Patient works with gymnastics and as such spots frequently and is still performing activity with the elbow immobilized at 90 degrees with a wrist brace. Patient has been able to perform push-ups and do other exercise activities. Denies any numbness or tingling of her affected extremity. Patient's pain has been alleviated somewhat and is mild when it occurs mostly attributes weakness. No past medical history on file. No past surgical history on file.   Social History     Socioeconomic History    Marital status:      Spouse name: Not on file    Number of children: Not on file    Years of education: Not on file    Highest education level: Not on file   Occupational History    Not on file   Social Needs    Financial resource strain: Somewhat hard    Food insecurity     Worry: Never true     Inability: Never true    Transportation needs     Medical: No     Non-medical: No   Tobacco Use    Smoking status: Current Some Day Smoker    Smokeless tobacco: Never Used   Substance and Sexual Activity    Alcohol use: Not Currently     Comment: occass    Drug use: Never    Sexual activity: Not Currently   Lifestyle    Physical activity     Days per week: Not on file     Minutes per session: Not on file    Stress: Not on file   Relationships    Social connections     Talks on phone: Not on file     Gets together: Not on file     Attends Latter-day service: Not on file     Active member of club or organization: Not on file     Attends meetings of clubs or organizations: Not on file     Relationship status: Not on file    Intimate partner violence     Fear of current or ex partner: Not on file     Emotionally abused: Not on file     Physically abused: Not on file     Forced sexual activity: Not on file   Other Topics Concern    Not on file   Social History Narrative    Not on file     No family history on file. Allergies   Allergen Reactions    Azithromycin Other (See Comments)     BRUISING    Clarithromycin      Other reaction(s): Unknown  Causes Bruises    Molds & Smuts      Other reaction(s): Intolerance    Seasonal      Other reaction(s): Intolerance     Current Outpatient Medications on File Prior to Visit   Medication Sig Dispense Refill    fluticasone (FLONASE) 50 MCG/ACT nasal spray 2 sprays by Nasal route daily 1 Bottle 0    doxyLAMINE succinate (GNP SLEEP AID) 25 MG tablet Take 1 tablet by mouth nightly as needed for Sleep 30 tablet 1    levothyroxine (SYNTHROID) 125 MCG tablet Take 1 tablet by mouth once daily.  albuterol sulfate  (90 Base) MCG/ACT inhaler INHALE 2 (TWO) puffs EVERY 4 HOURS AS NEEDED      Multiple Vitamin (MVI, CELEBRATE, CHEWABLE TABLET) Take 1 tablet by mouth daily      metoprolol succinate (TOPROL XL) 25 MG extended release tablet Take 1 tablet by mouth daily 30 tablet 5     No current facility-administered medications on file prior to visit.           Review of Systems   General: Denies fever, chills  Cardiovascular: Denies chest pain  Pulmonary: Denies shortness of breath  GI: Denies nausea or vomiting  Neuro: Denies numbness or tingling      Objective:   Temp 97.6 °F (36.4 °C) (Temporal)   Ht 5' 7\" (1.702 m)   Wt 149 lb 12.8 oz (67.9 kg)   LMP 11/24/2020 (Approximate)   BMI 23.46 kg/m²     Ortho Exam   General: Well-appearing female who appears their stated age  Left upper extremity:  Skin: Intact circumferentially, no swelling, ecchymosis or edema

## 2020-12-23 ENCOUNTER — TELEPHONE (OUTPATIENT)
Dept: ORTHOPEDIC SURGERY | Age: 50
End: 2020-12-23

## 2020-12-23 NOTE — TELEPHONE ENCOUNTER
[facility] dept called stating that Pt's MRI has been sent for [id number] review. If you would like to do a [id number]-yc-bujw discussion vee inderjit is open 8:30a - 5p. Please advise.      [facility]   Tracking #: [id number]  P #: [phone]

## 2020-12-28 ENCOUNTER — HOSPITAL ENCOUNTER (OUTPATIENT)
Dept: MRI IMAGING | Age: 50
Discharge: HOME | End: 2020-12-30
Payer: COMMERCIAL

## 2020-12-28 PROCEDURE — 73221 MRI JOINT UPR EXTREM W/O DYE: CPT

## 2021-01-04 ENCOUNTER — OFFICE VISIT (OUTPATIENT)
Dept: ORTHOPEDIC SURGERY | Age: 51
End: 2021-01-04
Payer: COMMERCIAL

## 2021-01-04 VITALS
TEMPERATURE: 96.7 F | WEIGHT: 149 LBS | OXYGEN SATURATION: 98 % | HEIGHT: 67 IN | BODY MASS INDEX: 23.39 KG/M2 | HEART RATE: 76 BPM

## 2021-01-04 DIAGNOSIS — S46.212D BICEPS RUPTURE, DISTAL, LEFT, SUBSEQUENT ENCOUNTER: Primary | ICD-10-CM

## 2021-01-04 PROCEDURE — 99213 OFFICE O/P EST LOW 20 MIN: CPT | Performed by: ORTHOPAEDIC SURGERY

## 2021-01-04 NOTE — PROGRESS NOTES
Subjective:      Patient ID: Carollee Gosselin is a 53 y.o. female who presents today for:  Chief Complaint   Patient presents with    Results     pt here for MRI results of left elow, MRI Obtained 12/28, pt states she has some pain. HPI  Patient returns here today for follow-up on her left elbow. Patient states that she has had persistent pain in her left elbow as well as continued weakness. Patient has continued to do her job which does require significant supination and flexion activities of the left elbow. Patient did obtain MRI per my order and is here today for follow-up on that study. Patient denies any new injuries or trauma since that time    History reviewed. No pertinent past medical history. History reviewed. No pertinent surgical history.   Social History     Socioeconomic History    Marital status:      Spouse name: Not on file    Number of children: Not on file    Years of education: Not on file    Highest education level: Not on file   Occupational History    Not on file   Social Needs    Financial resource strain: Somewhat hard    Food insecurity     Worry: Never true     Inability: Never true    Transportation needs     Medical: No     Non-medical: No   Tobacco Use    Smoking status: Current Some Day Smoker    Smokeless tobacco: Never Used   Substance and Sexual Activity    Alcohol use: Not Currently     Comment: occass    Drug use: Never    Sexual activity: Not Currently   Lifestyle    Physical activity     Days per week: Not on file     Minutes per session: Not on file    Stress: Not on file   Relationships    Social connections     Talks on phone: Not on file     Gets together: Not on file     Attends Bahai service: Not on file     Active member of club or organization: Not on file     Attends meetings of clubs or organizations: Not on file     Relationship status: Not on file    Intimate partner violence     Fear of current or ex partner: Not on file Emotionally abused: Not on file     Physically abused: Not on file     Forced sexual activity: Not on file   Other Topics Concern    Not on file   Social History Narrative    Not on file     History reviewed. No pertinent family history. Allergies   Allergen Reactions    Azithromycin Other (See Comments)     BRUISING    Clarithromycin      Other reaction(s): Unknown  Causes Bruises    Molds & Smuts      Other reaction(s): Intolerance    Seasonal      Other reaction(s): Intolerance     Current Outpatient Medications on File Prior to Visit   Medication Sig Dispense Refill    fluticasone (FLONASE) 50 MCG/ACT nasal spray 2 sprays by Nasal route daily 1 Bottle 0    doxyLAMINE succinate (GNP SLEEP AID) 25 MG tablet Take 1 tablet by mouth nightly as needed for Sleep 30 tablet 1    levothyroxine (SYNTHROID) 125 MCG tablet Take 1 tablet by mouth once daily.  albuterol sulfate  (90 Base) MCG/ACT inhaler INHALE 2 (TWO) puffs EVERY 4 HOURS AS NEEDED      Multiple Vitamin (MVI, CELEBRATE, CHEWABLE TABLET) Take 1 tablet by mouth daily      metoprolol succinate (TOPROL XL) 25 MG extended release tablet Take 1 tablet by mouth daily 30 tablet 5     No current facility-administered medications on file prior to visit. Review of Systems  General: Denies fever, chills  Cardiovascular: Denies chest pain  Pulmonary: Denies shortness of breath  GI: Denies nausea or vomiting  Neuro: Denies numbness or tingling      Objective:   Pulse 76   Temp 96.7 °F (35.9 °C) (Temporal)   Ht 5' 7\" (1.702 m)   Wt 149 lb (67.6 kg)   SpO2 98%   BMI 23.34 kg/m²     Ortho Exam   Left upper extremity:  Skin: Intact circumferentially, no swelling, ecchymosis or edema is appreciated  Neuro: Sensation intact light touch in the radial, ulnar and median nerve distribution. Able to perform all cardinal hand movements. Vascular: Strong palpable radial pulse, brisk cap refill in all digits. MSK: Patient remains in elbow brace.

## 2021-01-11 ENCOUNTER — OFFICE VISIT (OUTPATIENT)
Dept: ORTHOPEDIC SURGERY | Age: 51
End: 2021-01-11
Payer: COMMERCIAL

## 2021-01-11 VITALS
BODY MASS INDEX: 23.39 KG/M2 | TEMPERATURE: 97.1 F | WEIGHT: 149 LBS | HEART RATE: 82 BPM | OXYGEN SATURATION: 99 % | HEIGHT: 67 IN

## 2021-01-11 DIAGNOSIS — S46.222A: Primary | ICD-10-CM

## 2021-01-11 PROCEDURE — 99214 OFFICE O/P EST MOD 30 MIN: CPT | Performed by: ORTHOPAEDIC SURGERY

## 2021-01-11 NOTE — PATIENT INSTRUCTIONS
We discussed with her that she is now 2 and half months out after a laceration distal biceps tendon, and usually this is not fixable, however the MRI reveals that the tendon is still out to length, and most likely is still reattachable without a tendon graft. We discussed that we should be ready to use a tendon graft. Discussed the procedure, as well as the risks extensively, nerve artery damage, injury to the posterior knowledges nerve, stiffness specifically in her case that might take some time to resolve, we discussed limited risk of infection. We also discussed the nonsurgical treatment, with weakness in flexion and supination. She would like to proceed.

## 2021-01-11 NOTE — PROGRESS NOTES
Subjective:      Patient ID: Abad Munguia is a 53 y.o. female who presents today for:  Chief Complaint   Patient presents with    Arm Injury     pt has seen Dr. Juan Caputo on 1/4/21,12/14/20, pt states she has pain and discomfort with movement, pt describes as ache       HPI:    Cameron Georges ruptured early November her left distal biceps tendon when she was catching someone in gymnastics. 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 History reviewed. No pertinent past medical history. History reviewed. No pertinent surgical history.   Social History     Socioeconomic History    Marital status:      Spouse name: Not on file    Number of children: Not on file    Years of education: Not on file    Highest education level: Not on file   Occupational History    Not on file   Social Needs    Financial resource strain: Somewhat hard    Food insecurity     Worry: Never true     Inability: Never true    Transportation needs     Medical: No     Non-medical: No   Tobacco Use    Smoking status: Current Some Day Smoker    Smokeless tobacco: Never Used   Substance and Sexual Activity    Alcohol use: Not Currently     Comment: occass    Drug use: Never    Sexual activity: Not Currently   Lifestyle    Physical activity     Days per week: Not on file     Minutes per session: Not on file    Stress: Not on file   Relationships    Social connections     Talks on phone: Not on file     Gets together: Not on file     Attends Worship service: Not on file     Active member of club or organization: Not on file     Attends meetings of clubs or organizations: Not on file     Relationship status: Not on file    Intimate partner violence     Fear of current or ex partner: Not on file     Emotionally abused: Not on file     Physically abused: Not on file     Forced sexual activity: Not on file   Other Topics Concern    Not on file   Social History Narrative    Not on file     History reviewed. No pertinent family history. Allergies   Allergen Reactions    Azithromycin Other (See Comments)     BRUISING    Clarithromycin      Other reaction(s): Unknown  Causes Bruises    Molds & Smuts      Other reaction(s): Intolerance    Seasonal      Other reaction(s): Intolerance     Current Outpatient Medications on File Prior to Visit   Medication Sig Dispense Refill    fluticasone (FLONASE) 50 MCG/ACT nasal spray 2 sprays by Nasal route daily 1 Bottle 0    doxyLAMINE succinate (GNP SLEEP AID) 25 MG tablet Take 1 tablet by mouth nightly as needed for Sleep 30 tablet 1    levothyroxine (SYNTHROID) 125 MCG tablet Take 1 tablet by mouth once daily.  albuterol sulfate  (90 Base) MCG/ACT inhaler INHALE 2 (TWO) puffs EVERY 4 HOURS AS NEEDED      Multiple Vitamin (MVI, CELEBRATE, CHEWABLE TABLET) Take 1 tablet by mouth daily      metoprolol succinate (TOPROL XL) 25 MG extended release tablet Take 1 tablet by mouth daily 30 tablet 5     No current facility-administered medications on file prior to visit. Acute and Chronic Pain Monitoring:   No flowsheet data found. Review of Systems  [1]    Objective--Physical Examination:     Pulse 82   Temp 97.1 °F (36.2 °C)   Ht 5' 7\" (1.702 m)   Wt 149 lb (67.6 kg)   SpO2 99%   BMI 23.34 kg/m²     Physical Examination:   Ortho Exam  Ms. Oxana Putnam is a lean 67-year-old woman, not impaired distress. The left elbow has a full range of motion with distal palpable biceps tendon. With supination and pronation there is no motion of the biceps, the bicep is slightly high riding. She is neurovascular intact. Radiographs and Laboratory Studies:     Diagnostic Imaging Studies:    MRI reveals a detached left distal biceps tendon, shortened approximately 2 cm. The tendon is likely at length.     Laboratory Studies:   Lab Results   Component Value Date    WBC 7.9 09/14/2020    HGB 14.0 09/14/2020    HCT 42.5 09/14/2020    MCV 88.7 09/14/2020     2020     No results found for: SEDRATE  No results found for: CRP      Procedures Performed Today:     [1]    Assessment / Plan:      Diagnosis Orders   1. Laceration of muscle, fascia and tendon of other parts of biceps, left arm, initial encounter        No orders of the defined types were placed in this encounter. No orders of the defined types were placed in this encounter. Patient Instructions   We discussed with her that she is now 2 and half months out after a laceration distal biceps tendon, and usually this is not fixable, however the MRI reveals that the tendon is still out to length, and most likely is still reattachable without a tendon graft. We discussed that we should be ready to use a tendon graft. Discussed the procedure, as well as the risks extensively, nerve artery damage, injury to the posterior knowledges nerve, stiffness specifically in her case that might take some time to resolve, we discussed limited risk of infection. We also discussed the nonsurgical treatment, with weakness in flexion and supination. She would like to proceed. No follow-ups on file.     Magalie Herman M.D., [address] [place] [address]  Orthopaedic Surgery       Surgery Phone: [facility]   Surgery Fax: [phone]    Phone: [phone]   PAT Fax: [phone]    Fax: [phone]    Orthopedics: Surgery Scheduling, PAT & PRE-OP Order Form  Call to advance [place] at [phone] at least 24 hours prior to date of service     [facility]: [address] [place] [address] Savi Garcia MD Surgery Date: 1/15/2021 time: 36  Patient's Name: Erwin Contreras : 1970    Gender: female  Home Phone:  [phone] Cell Phone: [phone]  Emergency Contact:  Nancy Meza   Phone: [phone]  Payor: Aida Bernabe /  /  /    ID No.: [id number]      PROVIDER TO COMPLETE:  Diagnosis: Left distal biceps tendon rupture  Procedure/Consent: Reattachment left distal biceps tendon  Case Comments/Implants: #5 Ethibond, suture passer x2, drill  Surgery Scheduled as:  Outpatient  Anesthesia Requested: General  Referring Family Doctor: Lara Ball MD   [facility]  [x] [id number] [facility] Date/Time:                                                            [x] History & Physical [] Physician will Provide [] Attached [] Dictated [] Other  [x] Follow Anesthesia Pre-Op Orders for X-rays, [facility]     [x] SN & PT to evaluate and treat/educate disease management, medications, home safety & equipment needs for total joint patients  [] Other: ____________________________________________________  Consults: Medical/Cardiac Clearance done by  ____________________  PRE-OP ORDERS:   Allergies: Azithromycin, Clarithromycin, Molds & smuts, and Seasonal Latex Allergies:             Diabetic:           [] IV ________________________  [x] IV Start with J-loop     Preprinted Orders: Attached [] Yes [] No   ANTIBIOTIC PRE-OP: [x] ANCEF 2 gram IVPB if > 120 kg 3 grams IVPB within 1 hour of incision, if ALLERGIC, use VANCOMYCIN 1 gram IV, 2 hours pre-op  [] TXA Protocol [] Other: [x] NPO   [] Betablocker (if needed) _____________________________________   [] Knee high anti-embolic hose [] Thigh high anti-embolic hose   Other: ______________________________________________________    Physician Signature Required:    date/Time: 1/11/2021

## 2021-01-13 ENCOUNTER — OFFICE VISIT (OUTPATIENT)
Dept: ORTHOPEDIC SURGERY | Age: 51
End: 2021-01-13
Payer: COMMERCIAL

## 2021-01-13 ENCOUNTER — NURSE ONLY (OUTPATIENT)
Dept: PRIMARY CARE CLINIC | Age: 51
End: 2021-01-13

## 2021-01-13 VITALS
OXYGEN SATURATION: 99 % | WEIGHT: 149 LBS | HEART RATE: 75 BPM | HEIGHT: 67 IN | BODY MASS INDEX: 23.39 KG/M2 | TEMPERATURE: 96.7 F

## 2021-01-13 DIAGNOSIS — Z01.818 ENCOUNTER FOR PREADMISSION TESTING: ICD-10-CM

## 2021-01-13 DIAGNOSIS — Z01.818 PREOP EXAMINATION: Primary | ICD-10-CM

## 2021-01-13 DIAGNOSIS — Z01.818 ENCOUNTER FOR PREADMISSION TESTING: Primary | ICD-10-CM

## 2021-01-13 DIAGNOSIS — Z01.818 PREOP EXAMINATION: ICD-10-CM

## 2021-01-13 LAB
ANION GAP SERPL CALCULATED.3IONS-SCNC: 11 MEQ/L (ref 9–15)
BUN BLDV-MCNC: 13 MG/DL (ref 6–20)
CALCIUM SERPL-MCNC: 9.1 MG/DL (ref 8.5–9.9)
CHLORIDE BLD-SCNC: 102 MEQ/L (ref 95–107)
CO2: 25 MEQ/L (ref 20–31)
CREAT SERPL-MCNC: 0.61 MG/DL (ref 0.5–0.9)
GFR AFRICAN AMERICAN: >60
GFR NON-AFRICAN AMERICAN: >60
GLUCOSE BLD-MCNC: 85 MG/DL (ref 70–99)
HCG(URINE) PREGNANCY TEST: NEGATIVE
HCT VFR BLD CALC: 38.8 % (ref 37–47)
HEMOGLOBIN: 12.9 G/DL (ref 12–16)
MCH RBC QN AUTO: 29.7 PG (ref 27–31.3)
MCHC RBC AUTO-ENTMCNC: 33.2 % (ref 33–37)
MCV RBC AUTO: 89.3 FL (ref 82–100)
PDW BLD-RTO: 13.7 % (ref 11.5–14.5)
PLATELET # BLD: 310 K/UL (ref 130–400)
POTASSIUM SERPL-SCNC: 4 MEQ/L (ref 3.4–4.9)
RBC # BLD: 4.35 M/UL (ref 4.2–5.4)
SODIUM BLD-SCNC: 138 MEQ/L (ref 135–144)
WBC # BLD: 6.6 K/UL (ref 4.8–10.8)

## 2021-01-13 PROCEDURE — 99214 OFFICE O/P EST MOD 30 MIN: CPT | Performed by: PHYSICIAN ASSISTANT

## 2021-01-13 NOTE — PROGRESS NOTES
[place] [address] [facility]    H&P: Preadmission Testing     Patient: Jason Altman  YOB: 1970  MRN: [id number]    Subjective:     Chief Complaint   Patient presents with    Pre-op Exam     pt here for pre op, surgery 1/15, pt states she has very little pain today. HPI: Jason Altman is a 53 y.o. female is here for preop evaluation w/ pertinent PMHx of obstructive lung disease, atypical chest pain that was thoroughly worked up by cardiology. Stress echo was normal.  They believe that is constituted to a vasovagal response. She has no atypical chest pain since her initial events. She is no longer taking her aspirin or beta-blocker. She also has asthma which is refractory to allergic response. Is well under control at this point. She not take any blood thinners. She is premenopausal.  Is not a smoker nor diabetic      Past Medical History:    History reviewed. No pertinent past medical history. Past Surgical History:    History reviewed. No pertinent surgical history. Medications Prior to Admission:    Current Outpatient Medications   Medication Sig Dispense Refill    fluticasone (FLONASE) 50 MCG/ACT nasal spray 2 sprays by Nasal route daily 1 Bottle 0    doxyLAMINE succinate (GNP SLEEP AID) 25 MG tablet Take 1 tablet by mouth nightly as needed for Sleep 30 tablet 1    levothyroxine (SYNTHROID) 125 MCG tablet Take 1 tablet by mouth once daily.  albuterol sulfate  (90 Base) MCG/ACT inhaler INHALE 2 (TWO) puffs EVERY 4 HOURS AS NEEDED      Multiple Vitamin (MVI, CELEBRATE, CHEWABLE TABLET) Take 1 tablet by mouth daily      metoprolol succinate (TOPROL XL) 25 MG extended release tablet Take 1 tablet by mouth daily 30 tablet 5     No current facility-administered medications for this visit.         Allergies:    Azithromycin, Clarithromycin, Molds & smuts, and Seasonal    Social History:   Social History     Socioeconomic History    Marital status:      Spouse name: Not on file    Number of children: Not on file    Years of education: Not on file    Highest education level: Not on file   Occupational History    Not on file   Social Needs    Financial resource strain: Somewhat hard    Food insecurity     Worry: Never true     Inability: Never true    Transportation needs     Medical: No     Non-medical: No   Tobacco Use    Smoking status: Current Some Day Smoker    Smokeless tobacco: Never Used   Substance and Sexual Activity    Alcohol use: Not Currently     Comment: occass    Drug use: Never    Sexual activity: Not Currently   Lifestyle    Physical activity     Days per week: Not on file     Minutes per session: Not on file    Stress: Not on file   Relationships    Social connections     Talks on phone: Not on file     Gets together: Not on file     Attends Congregation service: Not on file     Active member of club or organization: Not on file     Attends meetings of clubs or organizations: Not on file     Relationship status: Not on file    Intimate partner violence     Fear of current or ex partner: Not on file     Emotionally abused: Not on file     Physically abused: Not on file     Forced sexual activity: Not on file   Other Topics Concern    Not on file   Social History Narrative    Not on file       Family History:   History reviewed. No pertinent family history. Objective:   Pulse 75   Temp 96.7 °F (35.9 °C) (Temporal)   Ht 5' 7\" (1.702 m)   Wt 149 lb (67.6 kg)   SpO2 99%   BMI 23.34 kg/m²     Physical Exam  Constitutional:       General: She is not in acute distress. Appearance: Normal appearance. She is not ill-appearing. HENT:      Head: Normocephalic. Nose: Nose normal. No congestion or rhinorrhea. Mouth/Throat:      Mouth: Mucous membranes are moist.      Pharynx: Oropharynx is clear. No oropharyngeal exudate or posterior oropharyngeal erythema.    Eyes:      Extraocular Movements: Extraocular movements intact. Pupils: Pupils are equal, round, and reactive to light. Cardiovascular:      Rate and Rhythm: Normal rate and regular rhythm. Pulses: Normal pulses. Heart sounds: Normal heart sounds. No murmur. Pulmonary:      Effort: Pulmonary effort is normal.      Breath sounds: Normal breath sounds. No wheezing, rhonchi or rales. Abdominal:      General: Abdomen is flat. Bowel sounds are normal.      Palpations: Abdomen is soft. Tenderness: There is no abdominal tenderness. Skin:     General: Skin is warm and dry. Capillary Refill: Capillary refill takes less than 2 seconds. Comments: No swelling or erythema over the incision site   Neurological:      General: No focal deficit present. Mental Status: She is alert and oriented to person, place, and time. Radiographs and Laboratory Studies:   EKG:  Previous rating of the left bundle branch block, NSR  Laboratory Studies:   Lab Results   Component Value Date    WBC 7.9 09/14/2020    HGB 14.0 09/14/2020    HCT 42.5 09/14/2020    MCV 88.7 09/14/2020     09/14/2020     No results found for: SEDRATE  No results found for: CRP    Assessment and Plan:      Diagnosis Orders   1. Preop examination       Previously saw cardiology for atypical chest pain and discomfort. Extensive work-up was negative for any signs of artery disease or ischemia. Patient was instructed to quarantine until the day of surgery after getting the COVID test that she got yesterday. Blood work  was ordered and will be reviewed. I'll see them back 2 weeks postoperatively.     Naya Lombardo PA-C  [place]  [facility]  [phone]

## 2021-01-13 NOTE — PATIENT INSTRUCTIONS
-please walk over to our lab for your blood testing, located right outside our office.   -please ensure that you have made arrangements to get your Covid test done at our designated. Upon arrival, call [phone] and they will come out to your car to do the test.   -stop taking asprin and motrin until after your surgery.  -no eating / drinking the after midnight the night before surgery.

## 2021-01-14 LAB
SARS-COV-2: NOT DETECTED
SOURCE: NORMAL

## 2021-01-15 ENCOUNTER — ANESTHESIA EVENT (OUTPATIENT)
Dept: OPERATING ROOM | Age: 51
End: 2021-01-15
Payer: COMMERCIAL

## 2021-01-15 ENCOUNTER — HOSPITAL ENCOUNTER (OUTPATIENT)
Age: 51
Setting detail: OUTPATIENT SURGERY
Discharge: HOME | End: 2021-01-15
Attending: ORTHOPAEDIC SURGERY | Admitting: ORTHOPAEDIC SURGERY
Payer: COMMERCIAL

## 2021-01-15 ENCOUNTER — ANESTHESIA (OUTPATIENT)
Dept: OPERATING ROOM | Age: 51
End: 2021-01-15
Payer: COMMERCIAL

## 2021-01-15 VITALS
SYSTOLIC BLOOD PRESSURE: 112 MMHG | RESPIRATION RATE: 18 BRPM | WEIGHT: 149 LBS | HEIGHT: 66 IN | DIASTOLIC BLOOD PRESSURE: 67 MMHG | TEMPERATURE: 98 F | BODY MASS INDEX: 23.95 KG/M2 | HEART RATE: 67 BPM | OXYGEN SATURATION: 99 %

## 2021-01-15 VITALS — DIASTOLIC BLOOD PRESSURE: 53 MMHG | TEMPERATURE: 97.7 F | OXYGEN SATURATION: 100 % | SYSTOLIC BLOOD PRESSURE: 94 MMHG

## 2021-01-15 DIAGNOSIS — S46.212A RUPTURE OF LEFT DISTAL BICEPS TENDON, INITIAL ENCOUNTER: ICD-10-CM

## 2021-01-15 DIAGNOSIS — S46.212D RUPTURE OF LEFT DISTAL BICEPS TENDON, SUBSEQUENT ENCOUNTER: Primary | ICD-10-CM

## 2021-01-15 LAB
HCG, URINE, POC: NEGATIVE
Lab: NORMAL
NEGATIVE QC PASS/FAIL: NORMAL
POSITIVE QC PASS/FAIL: NORMAL

## 2021-01-15 PROCEDURE — 3600000004 HC SURGERY LEVEL 4 BASE: Performed by: ORTHOPAEDIC SURGERY

## 2021-01-15 PROCEDURE — 2580000003 HC RX 258: Performed by: ORTHOPAEDIC SURGERY

## 2021-01-15 PROCEDURE — 6360000002 HC RX W HCPCS: Performed by: ORTHOPAEDIC SURGERY

## 2021-01-15 PROCEDURE — 24342 REPAIR OF RUPTURED TENDON: CPT | Performed by: PHYSICIAN ASSISTANT

## 2021-01-15 PROCEDURE — 6360000002 HC RX W HCPCS: Performed by: NURSE ANESTHETIST, CERTIFIED REGISTERED

## 2021-01-15 PROCEDURE — 3700000000 HC ANESTHESIA ATTENDED CARE: Performed by: ORTHOPAEDIC SURGERY

## 2021-01-15 PROCEDURE — 7100000011 HC PHASE II RECOVERY - ADDTL 15 MIN: Performed by: ORTHOPAEDIC SURGERY

## 2021-01-15 PROCEDURE — 3700000001 HC ADD 15 MINUTES (ANESTHESIA): Performed by: ORTHOPAEDIC SURGERY

## 2021-01-15 PROCEDURE — 24342 REPAIR OF RUPTURED TENDON: CPT | Performed by: ORTHOPAEDIC SURGERY

## 2021-01-15 PROCEDURE — 3600000014 HC SURGERY LEVEL 4 ADDTL 15MIN: Performed by: ORTHOPAEDIC SURGERY

## 2021-01-15 PROCEDURE — 6370000000 HC RX 637 (ALT 250 FOR IP): Performed by: ORTHOPAEDIC SURGERY

## 2021-01-15 PROCEDURE — 7100000001 HC PACU RECOVERY - ADDTL 15 MIN: Performed by: ORTHOPAEDIC SURGERY

## 2021-01-15 PROCEDURE — 2709999900 HC NON-CHARGEABLE SUPPLY: Performed by: ORTHOPAEDIC SURGERY

## 2021-01-15 PROCEDURE — 7100000000 HC PACU RECOVERY - FIRST 15 MIN: Performed by: ORTHOPAEDIC SURGERY

## 2021-01-15 PROCEDURE — 7100000010 HC PHASE II RECOVERY - FIRST 15 MIN: Performed by: ORTHOPAEDIC SURGERY

## 2021-01-15 PROCEDURE — 6360000002 HC RX W HCPCS: Performed by: ANESTHESIOLOGY

## 2021-01-15 PROCEDURE — 76942 ECHO GUIDE FOR BIOPSY: CPT | Performed by: ANESTHESIOLOGY

## 2021-01-15 PROCEDURE — 2500000003 HC RX 250 WO HCPCS: Performed by: NURSE ANESTHETIST, CERTIFIED REGISTERED

## 2021-01-15 RX ORDER — OXYCODONE HYDROCHLORIDE AND ACETAMINOPHEN 5; 325 MG/1; MG/1
1 TABLET ORAL EVERY 6 HOURS PRN
Qty: 20 TABLET | Refills: 0 | Status: SHIPPED | OUTPATIENT
Start: 2021-01-15 | End: 2021-01-20

## 2021-01-15 RX ORDER — HYDROCODONE BITARTRATE AND ACETAMINOPHEN 5; 325 MG/1; MG/1
2 TABLET ORAL PRN
Status: DISCONTINUED | OUTPATIENT
Start: 2021-01-15 | End: 2021-01-15 | Stop reason: HOSPADM

## 2021-01-15 RX ORDER — MAGNESIUM HYDROXIDE 1200 MG/15ML
LIQUID ORAL CONTINUOUS PRN
Status: COMPLETED | OUTPATIENT
Start: 2021-01-15 | End: 2021-01-15

## 2021-01-15 RX ORDER — CEFAZOLIN SODIUM 2 G/50ML
2 SOLUTION INTRAVENOUS
Status: COMPLETED | OUTPATIENT
Start: 2021-01-15 | End: 2021-01-15

## 2021-01-15 RX ORDER — PROPOFOL 10 MG/ML
INJECTION, EMULSION INTRAVENOUS PRN
Status: DISCONTINUED | OUTPATIENT
Start: 2021-01-15 | End: 2021-01-15 | Stop reason: SDUPTHER

## 2021-01-15 RX ORDER — LIDOCAINE HYDROCHLORIDE 10 MG/ML
INJECTION, SOLUTION EPIDURAL; INFILTRATION; INTRACAUDAL; PERINEURAL PRN
Status: DISCONTINUED | OUTPATIENT
Start: 2021-01-15 | End: 2021-01-15 | Stop reason: SDUPTHER

## 2021-01-15 RX ORDER — SODIUM CHLORIDE, SODIUM LACTATE, POTASSIUM CHLORIDE, CALCIUM CHLORIDE 600; 310; 30; 20 MG/100ML; MG/100ML; MG/100ML; MG/100ML
INJECTION, SOLUTION INTRAVENOUS
Status: COMPLETED
Start: 2021-01-15 | End: 2021-01-15

## 2021-01-15 RX ORDER — METOCLOPRAMIDE HYDROCHLORIDE 5 MG/ML
10 INJECTION INTRAMUSCULAR; INTRAVENOUS
Status: DISCONTINUED | OUTPATIENT
Start: 2021-01-15 | End: 2021-01-15 | Stop reason: HOSPADM

## 2021-01-15 RX ORDER — ROCURONIUM BROMIDE 10 MG/ML
INJECTION, SOLUTION INTRAVENOUS PRN
Status: DISCONTINUED | OUTPATIENT
Start: 2021-01-15 | End: 2021-01-15 | Stop reason: SDUPTHER

## 2021-01-15 RX ORDER — FENTANYL CITRATE 50 UG/ML
25 INJECTION, SOLUTION INTRAMUSCULAR; INTRAVENOUS EVERY 10 MIN PRN
Status: DISCONTINUED | OUTPATIENT
Start: 2021-01-15 | End: 2021-01-15 | Stop reason: HOSPADM

## 2021-01-15 RX ORDER — SODIUM CHLORIDE, SODIUM LACTATE, POTASSIUM CHLORIDE, CALCIUM CHLORIDE 600; 310; 30; 20 MG/100ML; MG/100ML; MG/100ML; MG/100ML
INJECTION, SOLUTION INTRAVENOUS CONTINUOUS
Status: DISCONTINUED | OUTPATIENT
Start: 2021-01-15 | End: 2021-01-15 | Stop reason: HOSPADM

## 2021-01-15 RX ORDER — MEPERIDINE HYDROCHLORIDE 25 MG/ML
12.5 INJECTION INTRAMUSCULAR; INTRAVENOUS; SUBCUTANEOUS EVERY 5 MIN PRN
Status: DISCONTINUED | OUTPATIENT
Start: 2021-01-15 | End: 2021-01-15 | Stop reason: HOSPADM

## 2021-01-15 RX ORDER — DEXAMETHASONE SODIUM PHOSPHATE 10 MG/ML
INJECTION INTRAMUSCULAR; INTRAVENOUS PRN
Status: DISCONTINUED | OUTPATIENT
Start: 2021-01-15 | End: 2021-01-15 | Stop reason: SDUPTHER

## 2021-01-15 RX ORDER — ROPIVACAINE HYDROCHLORIDE 5 MG/ML
INJECTION, SOLUTION EPIDURAL; INFILTRATION; PERINEURAL PRN
Status: DISCONTINUED | OUTPATIENT
Start: 2021-01-15 | End: 2021-01-15 | Stop reason: SDUPTHER

## 2021-01-15 RX ORDER — MIDAZOLAM HYDROCHLORIDE 1 MG/ML
INJECTION INTRAMUSCULAR; INTRAVENOUS PRN
Status: DISCONTINUED | OUTPATIENT
Start: 2021-01-15 | End: 2021-01-15 | Stop reason: SDUPTHER

## 2021-01-15 RX ORDER — INDOMETHACIN 25 MG/1
25 CAPSULE ORAL 2 TIMES DAILY WITH MEALS
Status: DISCONTINUED | OUTPATIENT
Start: 2021-01-15 | End: 2021-01-15 | Stop reason: HOSPADM

## 2021-01-15 RX ORDER — INDOMETHACIN 25 MG/1
25 CAPSULE ORAL 2 TIMES DAILY WITH MEALS
Qty: 20 CAPSULE | Refills: 0 | Status: SHIPPED | OUTPATIENT
Start: 2021-01-15 | End: 2022-01-15

## 2021-01-15 RX ORDER — INDOMETHACIN 25 MG/1
25 CAPSULE ORAL 2 TIMES DAILY WITH MEALS
Qty: 20 CAPSULE | Refills: 0 | Status: SHIPPED | OUTPATIENT
Start: 2021-01-15 | End: 2021-07-26

## 2021-01-15 RX ORDER — ONDANSETRON 2 MG/ML
4 INJECTION INTRAMUSCULAR; INTRAVENOUS
Status: DISCONTINUED | OUTPATIENT
Start: 2021-01-15 | End: 2021-01-15 | Stop reason: HOSPADM

## 2021-01-15 RX ORDER — ONDANSETRON 2 MG/ML
INJECTION INTRAMUSCULAR; INTRAVENOUS PRN
Status: DISCONTINUED | OUTPATIENT
Start: 2021-01-15 | End: 2021-01-15 | Stop reason: SDUPTHER

## 2021-01-15 RX ORDER — DIPHENHYDRAMINE HYDROCHLORIDE 50 MG/ML
12.5 INJECTION INTRAMUSCULAR; INTRAVENOUS
Status: DISCONTINUED | OUTPATIENT
Start: 2021-01-15 | End: 2021-01-15 | Stop reason: HOSPADM

## 2021-01-15 RX ORDER — HYDROCODONE BITARTRATE AND ACETAMINOPHEN 5; 325 MG/1; MG/1
1 TABLET ORAL PRN
Status: DISCONTINUED | OUTPATIENT
Start: 2021-01-15 | End: 2021-01-15 | Stop reason: HOSPADM

## 2021-01-15 RX ADMIN — SODIUM CHLORIDE, POTASSIUM CHLORIDE, SODIUM LACTATE AND CALCIUM CHLORIDE: 600; 310; 30; 20 INJECTION, SOLUTION INTRAVENOUS at 09:20

## 2021-01-15 RX ADMIN — CEFAZOLIN SODIUM 2 G: 2 SOLUTION INTRAVENOUS at 08:07

## 2021-01-15 RX ADMIN — DEXAMETHASONE SODIUM PHOSPHATE 10 MG: 10 INJECTION INTRAMUSCULAR; INTRAVENOUS at 08:07

## 2021-01-15 RX ADMIN — SODIUM CHLORIDE, POTASSIUM CHLORIDE, SODIUM LACTATE AND CALCIUM CHLORIDE: 600; 310; 30; 20 INJECTION, SOLUTION INTRAVENOUS at 06:26

## 2021-01-15 RX ADMIN — SUGAMMADEX 140 MG: 100 INJECTION, SOLUTION INTRAVENOUS at 10:20

## 2021-01-15 RX ADMIN — ROPIVACAINE HYDROCHLORIDE 25 ML: 5 INJECTION, SOLUTION EPIDURAL; INFILTRATION; PERINEURAL at 06:58

## 2021-01-15 RX ADMIN — LIDOCAINE HYDROCHLORIDE 40 MG: 10 INJECTION, SOLUTION EPIDURAL; INFILTRATION; INTRACAUDAL; PERINEURAL at 07:59

## 2021-01-15 RX ADMIN — ONDANSETRON 4 MG: 2 INJECTION INTRAMUSCULAR; INTRAVENOUS at 09:41

## 2021-01-15 RX ADMIN — MIDAZOLAM HYDROCHLORIDE 2 MG: 2 INJECTION, SOLUTION INTRAMUSCULAR; INTRAVENOUS at 06:54

## 2021-01-15 RX ADMIN — PHENYLEPHRINE HYDROCHLORIDE 100 MCG: 10 INJECTION INTRAVENOUS at 09:27

## 2021-01-15 RX ADMIN — PROPOFOL 200 MG: 10 INJECTION, EMULSION INTRAVENOUS at 07:59

## 2021-01-15 RX ADMIN — PHENYLEPHRINE HYDROCHLORIDE 100 MCG: 10 INJECTION INTRAVENOUS at 09:11

## 2021-01-15 RX ADMIN — ROCURONIUM BROMIDE 50 MG: 10 INJECTION INTRAVENOUS at 07:59

## 2021-01-15 RX ADMIN — INDOMETHACIN 25 MG: 25 CAPSULE ORAL at 11:21

## 2021-01-15 ASSESSMENT — PULMONARY FUNCTION TESTS
PIF_VALUE: 14
PIF_VALUE: 1
PIF_VALUE: 13
PIF_VALUE: 16
PIF_VALUE: 15
PIF_VALUE: 16
PIF_VALUE: 14
PIF_VALUE: 16
PIF_VALUE: 8
PIF_VALUE: 1
PIF_VALUE: 4
PIF_VALUE: 16
PIF_VALUE: 15
PIF_VALUE: 13
PIF_VALUE: 13
PIF_VALUE: 4
PIF_VALUE: 13
PIF_VALUE: 13
PIF_VALUE: 16
PIF_VALUE: 21
PIF_VALUE: 13
PIF_VALUE: 13
PIF_VALUE: 8
PIF_VALUE: 16
PIF_VALUE: 8
PIF_VALUE: 13
PIF_VALUE: 4
PIF_VALUE: 14
PIF_VALUE: 2
PIF_VALUE: 14
PIF_VALUE: 8
PIF_VALUE: 16
PIF_VALUE: 20
PIF_VALUE: 14
PIF_VALUE: 13
PIF_VALUE: 3
PIF_VALUE: 14
PIF_VALUE: 8
PIF_VALUE: 13
PIF_VALUE: 8
PIF_VALUE: 13
PIF_VALUE: 16
PIF_VALUE: 15
PIF_VALUE: 15
PIF_VALUE: 14
PIF_VALUE: 14
PIF_VALUE: 16
PIF_VALUE: 16
PIF_VALUE: 8
PIF_VALUE: 14
PIF_VALUE: 8
PIF_VALUE: 15
PIF_VALUE: 14
PIF_VALUE: 8
PIF_VALUE: 8
PIF_VALUE: 16
PIF_VALUE: 13
PIF_VALUE: 13
PIF_VALUE: 0
PIF_VALUE: 16
PIF_VALUE: 13
PIF_VALUE: 15
PIF_VALUE: 13
PIF_VALUE: 13
PIF_VALUE: 14
PIF_VALUE: 13
PIF_VALUE: 2
PIF_VALUE: 14
PIF_VALUE: 3
PIF_VALUE: 14
PIF_VALUE: 13
PIF_VALUE: 20
PIF_VALUE: 16
PIF_VALUE: 15
PIF_VALUE: 1
PIF_VALUE: 14
PIF_VALUE: 15
PIF_VALUE: 13
PIF_VALUE: 14
PIF_VALUE: 13
PIF_VALUE: 13
PIF_VALUE: 16
PIF_VALUE: 1
PIF_VALUE: 15
PIF_VALUE: 14
PIF_VALUE: 10
PIF_VALUE: 13
PIF_VALUE: 8
PIF_VALUE: 8
PIF_VALUE: 13
PIF_VALUE: 8
PIF_VALUE: 13
PIF_VALUE: 0

## 2021-01-15 ASSESSMENT — LIFESTYLE VARIABLES: SMOKING_STATUS: 1

## 2021-01-15 ASSESSMENT — PAIN SCALES - GENERAL: PAINLEVEL_OUTOF10: 0

## 2021-01-15 NOTE — OP NOTE
Operative Note      Patient: Zohra Huber  YOB: 1970  MRN: [id number]    Date of Procedure: 1/15/2021    Pre-Op Diagnosis: LEFT DISTAL BICEPS TENDON RUPTURE    Post-Op Diagnosis: Same       Procedure(s):  REATTACHMENT LEFT DISTAL BICEPS TENDON    Surgeon(s):  Brittany Martell MD    Assistant:   Physician Assistant: Asim Pike PA-C    Anesthesia: General    Estimated Blood Loss (mL): 10 mL    Complications: None    Specimens:   * No specimens in log *    Implants:  * No implants in log *      Drains: * No LDAs found *    Findings: see below    Procedure:   Preoperatively, in the preoperative holding area, a huddle was  held with the operating room nurse, the surgeon and anesthesia, confirming  the patient, their age and allergies, the site, the procedure and if the site was marked, when the patient ate and drank last, if the antibiotics were hanging, that the patient would lose less than a half a liter of the blood, and that type and cross was not indicated,[that she was not pregnant] and if anyone of the staff or patient had any other concerns. The huddle was affirmed. The patient was brought to the operating room, received IV  antibiotics. After the induction of general anesthesia, the splint was  removed, tourniquet was placed high on the [right] arm, the [right] arm was  prepped and draped in the usual sterile fashion. Time-out was performed,   1.) Confirming the patient. 2.) Confirming allergies. 3.) Confirming the procedure. 4.) Confirming the site. 5.) Confirming the patient was asleep and that it was safe to proceed from   anesthesia standpoint. 6.) That the patient had received the antibiotics prescribed   7.) Confirming with the scrub nurse that all appropriate tools were present in the room. 8.) If there imaging studies were up on the Computer screen.   9.) that everyone was wearing lead was not indicated  10.) Noted that the tourniquet was set at 250 mmHg  11.) If the Chloro Prep had dried for 3 minutes prior to draping. 12.) The Fire Risk is a 3. The is saline on the table and a protector for the bovie and arthroscope light  13.) if Everyone in the room is wearing OSHA qualifying eye wear.  14.) That the patient most likely would not lose more than 500 mL blood, and that therefore no type & cross was indicated  15.) That SCD stockings were on, and that the pump was working. 16.) That the warming blanket was placed on the patient and was working  17.) To communicate exchanging sharp objects, the [employer] closed loop communication was used: Call-back  18.) That no one in the room had any concerns,  19.) Affirmation of the sign out was obtained. And the procedure was commenced    The [right] arm was tightly wrapped with an Esmarch, the tourniquet was inflated to 250 mmHg. An [4] cm incision was in the midline, approximately 4 cm proximal to the antecubital fossa. The incision was made with a 15 blade, and with blunt dissection the plane was taken down to the deep fascia was a split in the line incision. The biceps tendon was identified, and noted to still be attached distally as the MRI indicated and still attached to the lacertus fibrosis. A second incision was had over the forearm, and the pane was brought down to the biceps tendon, which was released. It was noted, that the tendon was detached of the radial tubercle and scarred down next to it. The distal stump was debrided. Tendon was encountered after 1 cm debridement. Using a #5 Ethibond suture, with needles on both ends, a [place] type suture technique was used starting at the musculoskeletal junction, extending to the tip of the biceps. Next a 7 cm incision was made over the radial tuberosity laterally. The incision was made with a 15 blade, and with sharp dissection the plane was taken down to the deep fascia.   The distal fascia was incised and the length of the incision, and the fascial plane between the extensor carpi ulnaris and extensor digitorum was identified. The plane was taken down to the deep fascia of the supinator. At this point the wrist was forcefully pronated, keeping the posterior interosseous nerve away of the incision, the fascia was incised, and the muscle was incised exposing the radial tuberosity. The radial tuberosity was debrided, and 4 drill holes were placed, taking great care to remove all bone debris. Using suture suture passers, a shuttle suture was placed. Next through the anterior incision a large Adam Cardenas was placed through the radial ulnar interval, coming out of the lateral wound, the shuttle sutures were grabbed, and brought out the anterior incision. In this fashion the sutures were advanced into the biceps tendon sheath, down to the radial tuberosity. The suture was tentatively tied, and the arm was taken through range of motion flexion extension supination pronation, cycling several times. The knot was again evaluated, and could be retightened. Again the elbow was cycled, noting no further loosening of the sutures. The wound was extensively irrigated, to remove all bone debris. The fascia of the supinator was approximated with 2-0 Vicryl suture. The fascia of the extensor carpi radialis was closed. The skin flaps were extensively debrided, the skin was approximated with 2-0 Vicryl suture, and closed with a subcuticular 3-0 Prolene. The anterior incision was extensively debrided, and the skin was approximated with mattress sutures using 3-0 Prolene. The tourniquet had been deflated, sterile dressing was applied, followed by a posterior splint. The patient was awoken anesthesia, and brought in the stable condition to the recovery room. Postoperative plan:  The patient was placed in a long-arm posterior splint. She will follow up in the office in 3 days for a wound check, and to place her in a removable posterior splint.   She may do gentle passive range of motion of the elbow. She may not lift anything heavier than a coffee cup. Recommend seeing her in the office 2 weeks later for a wound check and suture removal,  Recommend seeing her at the 6-week larisa for clinical evaluation, and tentatively start active flexion extension. Date: 1/15/2021  Time: 10:11 AM    Polly Frederick       The procedure was entered using voice recognition technology and it was edited but may contain inaccurate syntax or word sense.       Electronically signed by Polly Frederick MD on 1/15/2021 at 10:09 AM

## 2021-01-15 NOTE — PROGRESS NOTES
Intrascalene nerve block Left performed by Dr. Akilah Choe, pt tolerated well, assisted pt removing contact lenses, in case in bag above bed, pt states left arm feeling numb at this time

## 2021-01-15 NOTE — ANESTHESIA POSTPROCEDURE EVALUATION
Department of Anesthesiology  Postprocedure Note    Patient: Elvia Torres  MRN: [id number]  YOB: 1970  Date of evaluation: 1/15/2021  Time:  10:37 AM     Procedure Summary     Date: 01/15/21 Room / Location: [address]    Anesthesia Start: 5735 Anesthesia Stop: 7470    Procedure: REATTACHMENT LEFT DISTAL BICEPS TENDON (Left Arm Upper) Diagnosis: (LEFT DISTAL BICEPS TENDON RUPTURE)    Surgeons: Fei Chappell MD Responsible Provider: Ludie Boast, MD    Anesthesia Type: general, regional ASA Status: 2          Anesthesia Type: general, regional    [place] Phase I:      [place] Phase II:      Last vitals: Reviewed and per EMR flowsheets.        Anesthesia Post Evaluation    Patient location during evaluation: PACU  Patient participation: complete - patient participated  Level of consciousness: sleepy but conscious  Pain score: 0  Airway patency: patent  Nausea & Vomiting: no nausea and no vomiting  Complications: no  Cardiovascular status: blood pressure returned to baseline and hemodynamically stable  Respiratory status: acceptable  Hydration status: euvolemic

## 2021-01-15 NOTE — ANESTHESIA PRE PROCEDURE
patient. Past Medical History:        Diagnosis Date    Asthma     Thyroid disease        Past Surgical History:        Procedure Laterality Date    BACK INJECTION      pt states not under general, for herniated disc    WISDOM TOOTH EXTRACTION      WRIST FUSION Bilateral     platlet injections       Social History:    Social History     Tobacco Use    Smoking status: Current Some Day Smoker    Smokeless tobacco: Never Used   Substance Use Topics    Alcohol use: Not Currently     Comment: occass                                Ready to quit: Not Answered  Counseling given: Not Answered      Vital Signs (Current):   Vitals:    01/15/21 0545   BP: (!) 122/55   Pulse: 90   Resp: 16   Temp: 97.5 °F (36.4 °C)   TempSrc: Temporal   SpO2: 97%   Weight: 149 lb (67.6 kg)   Height: 5' 6\" (1.676 m)                                              BP Readings from Last 3 Encounters:   01/15/21 (!) 122/55   12/07/20 120/72   11/09/20 124/74       NPO Status: Time of last liquid consumption: 2100                        Time of last solid consumption: 2100                        Date of last liquid consumption: 01/14/21                        Date of last solid food consumption: 01/14/21    BMI:   Wt Readings from Last 3 Encounters:   01/15/21 149 lb (67.6 kg)   01/13/21 149 lb (67.6 kg)   01/11/21 149 lb (67.6 kg)     Body mass index is 24.05 kg/m².     CBC:   Lab Results   Component Value Date    WBC 6.6 01/13/2021    RBC 4.35 01/13/2021    HGB 12.9 01/13/2021    HCT 38.8 01/13/2021    MCV 89.3 01/13/2021    RDW 13.7 01/13/2021     01/13/2021       CMP:   Lab Results   Component Value Date     01/13/2021    K 4.0 01/13/2021     01/13/2021    CO2 25 01/13/2021    BUN 13 01/13/2021    CREATININE 0.61 01/13/2021    GFRAA >60.0 01/13/2021    LABGLOM >60.0 01/13/2021    GLUCOSE 85 01/13/2021    CALCIUM 9.1 01/13/2021       POC Tests: No results for input(s): POCGLU, POCNA, POCK, POCCL, POCBUN, POCHEMO, POCHCT

## 2021-01-15 NOTE — PROGRESS NOTES
occasional wheeze noted, patient stated she want red her inhaler, pt took 2 puffs of her Ventolin inhaler & felt better,  aware.

## 2021-01-15 NOTE — ANESTHESIA PROCEDURE NOTES
Peripheral Block    Patient location during procedure: pre-op  Start time: 1/15/2021 6:55 AM  End time: 1/15/2021 7:00 AM  Staffing  Performed: anesthesiologist   Anesthesiologist: Rachel Waller MD  Preanesthetic Checklist  Completed: patient identified, IV checked, site marked, risks and benefits discussed, surgical consent, monitors and equipment checked, pre-op evaluation, timeout performed, anesthesia consent given, oxygen available and patient being monitored  Peripheral Block  Patient position: supine  Prep: ChloraPrep  Patient monitoring: continuous pulse ox, frequent blood pressure checks, IV access and cardiac monitor  Block type: Brachial plexus  Laterality: left  Injection technique: single-shot  Guidance: ultrasound guided  Local infiltration: ropivacaine  Infiltration strength: 0.5 %  Dose: 25 mL  Provider prep: mask and sterile gloves (Sterile probe cover)  Local infiltration: ropivacaine  Needle  Needle type: combined needle/nerve stimulator   Needle gauge: 22 G  Needle length: 5 cm  Needle localization: anatomical landmarks and ultrasound guidance  Assessment  Injection assessment: negative aspiration for heme, no paresthesia on injection and local visualized surrounding nerve on ultrasound  Paresthesia pain: immediately resolved  Slow fractionated injection: yes  Hemodynamics: stable  Additional Notes  Ultrasound image printed and saved in patient chart.     Sterile probe cover used    Reason for block: post-op pain management and at surgeon's request

## 2021-01-20 ENCOUNTER — OFFICE VISIT (OUTPATIENT)
Dept: ORTHOPEDIC SURGERY | Age: 51
End: 2021-01-20
Payer: COMMERCIAL

## 2021-01-20 VITALS
HEART RATE: 98 BPM | HEIGHT: 67 IN | TEMPERATURE: 97.4 F | WEIGHT: 149 LBS | OXYGEN SATURATION: 99 % | BODY MASS INDEX: 23.39 KG/M2

## 2021-01-20 DIAGNOSIS — S46.222A: Primary | ICD-10-CM

## 2021-01-20 PROCEDURE — A4570 SPLINT: HCPCS | Performed by: PHYSICIAN ASSISTANT

## 2021-01-20 PROCEDURE — 99024 POSTOP FOLLOW-UP VISIT: CPT | Performed by: PHYSICIAN ASSISTANT

## 2021-01-20 NOTE — PROGRESS NOTES
[address] [place] [address] and Sports Medicine      Subjective:      Chief Complaint   Patient presents with    Post-Op Check     surgery 01/15/21 pain today 0/10 but 4-5/10 if she moves it. Laceration of muscle, fascia and tendon of other parts of biceps, left arm       HPI: Jason Altman is a 53 y.o. female who is here about 5 days after a bicep tendon repair by Dr. Rod Cochran. Wearing her splint. Incision looks good.     Past Medical History:   Diagnosis Date    Asthma     Thyroid disease       Past Surgical History:   Procedure Laterality Date    BACK INJECTION      pt states not under general, for herniated disc    BICEPS TENDON REPAIR Left 1/15/2021    REATTACHMENT LEFT DISTAL BICEPS TENDON performed by Martha De Los Santos MD at [address] [place] [address] EXTRACTION      WRIST FUSION Bilateral     platlet injections     Social History     Socioeconomic History    Marital status:      Spouse name: Not on file    Number of children: Not on file    Years of education: Not on file    Highest education level: Not on file   Occupational History    Not on file   Social Needs    Financial resource strain: Somewhat hard    Food insecurity     Worry: Never true     Inability: Never true    Transportation needs     Medical: No     Non-medical: No   Tobacco Use    Smoking status: Current Some Day Smoker    Smokeless tobacco: Never Used   Substance and Sexual Activity    Alcohol use: Not Currently     Comment: occass    Drug use: Never    Sexual activity: Not Currently   Lifestyle    Physical activity     Days per week: Not on file     Minutes per session: Not on file    Stress: Not on file   Relationships    Social connections     Talks on phone: Not on file     Gets together: Not on file     Attends Taoism service: Not on file     Active member of club or organization: Not on file     Attends meetings of clubs or organizations: Not on file     Relationship status: Not on file   Johanny Gould reviewed. Laboratory Studies:   Lab Results   Component Value Date    WBC 6.6 01/13/2021    HGB 12.9 01/13/2021    HCT 38.8 01/13/2021    MCV 89.3 01/13/2021     01/13/2021     No results found for: Thalia Kelley  No results found for: CRP    Assessment and Plan:      Diagnosis Orders   1. Laceration of muscle, fascia and tendon of other parts of biceps, left arm, initial encounter         Continue wearing her long-arm splint at all times. Take it off for hygiene purposes. Also want take it out for 3-4 times a day to do passive range of motion of the elbow. Vision looks good. She instructed to call us if she have any issues, otherwise we will see her back next time Dr. Gillian Michelle is here for suture removal  She was written a note to be off work for the next 2 months. 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 The above plan was discussed in thorough detail with Dr. Sheila Lund and the patient. No orders of the defined types were placed in this encounter. No orders of the defined types were placed in this encounter. No follow-ups on file.     Bala Evans PA-C  [facility] and Sports Medicine  [phone]

## 2021-02-08 ENCOUNTER — OFFICE VISIT (OUTPATIENT)
Dept: ORTHOPEDIC SURGERY | Age: 51
End: 2021-02-08

## 2021-02-08 DIAGNOSIS — S46.222A: Primary | ICD-10-CM

## 2021-02-08 PROCEDURE — 99024 POSTOP FOLLOW-UP VISIT: CPT | Performed by: PHYSICIAN ASSISTANT

## 2021-02-08 NOTE — PROGRESS NOTES
[facility]  and Sports Medicine      Subjective:      Chief Complaint   Patient presents with    Post-Op Check     Laceration of muscle, fascia and tendon of other parts of biceps, left arm/ Surgery 01/15/21/ Pain is bad when she rotats her arm in any way. 3-4/10 when she moves it. HPI: Bernarda Ruiz is a 53 y.o. female who is here 2 to 3 weeks postop distal biceps repair. She has great extension and flexion. There is no pain associated with this. Decreased range of motion with pronation supination.   Incisions are great and sutures removed today    Past Medical History:   Diagnosis Date    Asthma     Thyroid disease       Past Surgical History:   Procedure Laterality Date    BACK INJECTION      pt states not under general, for herniated disc    BICEPS TENDON REPAIR Left 1/15/2021    REATTACHMENT LEFT DISTAL BICEPS TENDON performed by Zach Wallace MD at [address] [place] [address] EXTRACTION      WRIST FUSION Bilateral     platlet injections     Social History     Socioeconomic History    Marital status:      Spouse name: Not on file    Number of children: Not on file    Years of education: Not on file    Highest education level: Not on file   Occupational History    Not on file   Social Needs    Financial resource strain: Somewhat hard    Food insecurity     Worry: Never true     Inability: Never true    Transportation needs     Medical: No     Non-medical: No   Tobacco Use    Smoking status: Current Some Day Smoker    Smokeless tobacco: Never Used   Substance and Sexual Activity    Alcohol use: Not Currently     Comment: occass    Drug use: Never    Sexual activity: Not Currently   Lifestyle    Physical activity     Days per week: Not on file     Minutes per session: Not on file    Stress: Not on file   Relationships    Social connections     Talks on phone: Not on file     Gets together: Not on file     Attends Yazidism service: Not on file     Active member of club or organization: Not on file     Attends meetings of clubs or organizations: Not on file     Relationship status: Not on file    Intimate partner violence     Fear of current or ex partner: Not on file     Emotionally abused: Not on file     Physically abused: Not on file     Forced sexual activity: Not on file   Other Topics Concern    Not on file   Social History Narrative    Not on file     No family history on file. Allergies   Allergen Reactions    Azithromycin Other (See Comments)     BRUISING    Clarithromycin      Other reaction(s): Unknown  Causes Bruises    Molds & Smuts      Other reaction(s): Intolerance    Seasonal      Other reaction(s): Intolerance     Current Outpatient Medications on File Prior to Visit   Medication Sig Dispense Refill    MELATONIN GUMMIES PO Take by mouth daily as needed      indomethacin (INDOCIN) 25 MG capsule Take 1 capsule by mouth 2 times daily (with meals) 20 capsule 0    fluticasone (FLONASE) 50 MCG/ACT nasal spray 2 sprays by Nasal route daily 1 Bottle 0    doxyLAMINE succinate (GNP SLEEP AID) 25 MG tablet Take 1 tablet by mouth nightly as needed for Sleep 30 tablet 1    levothyroxine (SYNTHROID) 125 MCG tablet Take 1 tablet by mouth once daily.  albuterol sulfate  (90 Base) MCG/ACT inhaler INHALE 2 (TWO) puffs EVERY 4 HOURS AS NEEDED      Multiple Vitamin (MVI, CELEBRATE, CHEWABLE TABLET) Take 1 tablet by mouth daily      indomethacin (INDOCIN) 25 MG capsule Take 1 capsule by mouth 2 times daily (with meals) for 10 days 20 capsule 0     No current facility-administered medications on file prior to visit. Objective: There were no vitals taken for this visit. General: WDWN, well appearing, in no distress   Skin: without breakdown, rash, normal in color        Radiographs and Laboratory Studies:   Previous diagnostic imaging studies were reviewed.        Laboratory Studies:   Lab Results   Component Value Date    WBC 6.6 01/13/2021    HGB 12.9 01/13/2021    HCT 38.8 01/13/2021    MCV 89.3 01/13/2021     01/13/2021     No results found for: SEDRATE  No results found for: CRP    Assessment and Plan:      Diagnosis Orders   1. Laceration of muscle, fascia and tendon of other parts of biceps, left arm, initial encounter         2 weeks postoperative assessment of the biceps rupture repair. Incisions look great today. He was put into his new splint and encouraged him to do active and passive range of motion with flexion extension only at that elbow. SHe can also begin pronation and supination at the 2-3 week larisa. Unfortunately, no weightbearing more than a cup of coffee until the 6-week larisa. We will see him back at that point and progressive weightbearing status to 5 pounds via curling and start therapy. Expected recovery period is 5 to 6 months in length. The above plan was discussed in thorough detail with Dr. Dennis August and the patient. No orders of the defined types were placed in this encounter. No orders of the defined types were placed in this encounter. No follow-ups on file.     Alina Dominguez PA-C  [facility] and Sports Medicine  [phone]

## 2021-03-08 ENCOUNTER — OFFICE VISIT (OUTPATIENT)
Dept: ORTHOPEDIC SURGERY | Age: 51
End: 2021-03-08

## 2021-03-08 VITALS
HEART RATE: 89 BPM | TEMPERATURE: 97.1 F | HEIGHT: 67 IN | BODY MASS INDEX: 23.39 KG/M2 | WEIGHT: 149 LBS | OXYGEN SATURATION: 98 %

## 2021-03-08 DIAGNOSIS — S46.212D BICEPS RUPTURE, DISTAL, LEFT, SUBSEQUENT ENCOUNTER: Primary | ICD-10-CM

## 2021-03-08 PROCEDURE — 99024 POSTOP FOLLOW-UP VISIT: CPT | Performed by: PHYSICIAN ASSISTANT

## 2021-03-08 NOTE — PROGRESS NOTES
organizations: Not on file     Relationship status: Not on file    Intimate partner violence     Fear of current or ex partner: Not on file     Emotionally abused: Not on file     Physically abused: Not on file     Forced sexual activity: Not on file   Other Topics Concern    Not on file   Social History Narrative    Not on file     No family history on file. Allergies   Allergen Reactions    Azithromycin Other (See Comments)     BRUISING    Clarithromycin      Other reaction(s): Unknown  Causes Bruises    Molds & Smuts      Other reaction(s): Intolerance    Seasonal      Other reaction(s): Intolerance     Current Outpatient Medications on File Prior to Visit   Medication Sig Dispense Refill    MELATONIN GUMMIES PO Take by mouth daily as needed      indomethacin (INDOCIN) 25 MG capsule Take 1 capsule by mouth 2 times daily (with meals) for 10 days 20 capsule 0    indomethacin (INDOCIN) 25 MG capsule Take 1 capsule by mouth 2 times daily (with meals) 20 capsule 0    fluticasone (FLONASE) 50 MCG/ACT nasal spray 2 sprays by Nasal route daily 1 Bottle 0    doxyLAMINE succinate (GNP SLEEP AID) 25 MG tablet Take 1 tablet by mouth nightly as needed for Sleep 30 tablet 1    levothyroxine (SYNTHROID) 125 MCG tablet Take 1 tablet by mouth once daily.  albuterol sulfate  (90 Base) MCG/ACT inhaler INHALE 2 (TWO) puffs EVERY 4 HOURS AS NEEDED      Multiple Vitamin (MVI, CELEBRATE, CHEWABLE TABLET) Take 1 tablet by mouth daily       No current facility-administered medications on file prior to visit. Objective: There were no vitals taken for this visit. Radiographs and Laboratory Studies:   Previous diagnostic imaging studies were reviewed.      Laboratory Studies:   Lab Results   Component Value Date    WBC 6.6 01/13/2021    HGB 12.9 01/13/2021    HCT 38.8 01/13/2021    MCV 89.3 01/13/2021     01/13/2021     No results found for: SEDRATE  No results found for: CRP    Assessment and Plan:      Diagnosis Orders   1. Biceps rupture, distal, left, subsequent encounter  Ambulatory referral to Physical Therapy     Close to 2 months after biceps tendon repair left distally. Has been pretty much nonweightbearing with that arm, passive and active range of motion's only. Continue with motion exercises. Initiate biceps tendon rupture and repair therapy, 6 weeks since surgery. Curls with 5 advancing to 8 lbs while not at therapy. Works at the [employer] at [employer] course, she can return to work as a  as she feels she is able to. 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 We are hoping for May Rama timeframe for that. See back in 6 weeks  The above plan was discussed in thorough detail with Dr. Kaylee Mayers and the patient. No orders of the defined types were placed in this encounter. No orders of the defined types were placed in this encounter. No follow-ups on file.     Myra Stewart PA-C  [place] [address] and Sports Medicine  [phone]

## 2021-03-11 ENCOUNTER — PATIENT MESSAGE (OUTPATIENT)
Dept: ORTHOPEDIC SURGERY | Age: 51
End: 2021-03-11

## 2021-03-12 RX ORDER — LEVOTHYROXINE SODIUM 0.12 MG/1
125 TABLET ORAL DAILY
Qty: 60 TABLET | Refills: 2 | Status: SHIPPED | OUTPATIENT
Start: 2021-03-12 | End: 2021-09-28

## 2021-03-12 RX ORDER — ALBUTEROL SULFATE 90 UG/1
AEROSOL, METERED RESPIRATORY (INHALATION)
Qty: 1 INHALER | Refills: 3 | Status: SHIPPED | OUTPATIENT
Start: 2021-03-12 | End: 2021-06-30

## 2021-03-23 ENCOUNTER — HOSPITAL ENCOUNTER (OUTPATIENT)
Dept: PHYSICAL THERAPY | Age: 51
Setting detail: THERAPIES SERIES
Discharge: HOME | End: 2021-03-23
Payer: COMMERCIAL

## 2021-03-23 PROCEDURE — 97161 PT EVAL LOW COMPLEX 20 MIN: CPT | Performed by: PHYSICAL THERAPIST

## 2021-03-23 PROCEDURE — 97110 THERAPEUTIC EXERCISES: CPT | Performed by: PHYSICAL THERAPIST

## 2021-03-23 ASSESSMENT — PAIN DESCRIPTION - LOCATION: LOCATION: ELBOW

## 2021-03-23 ASSESSMENT — PAIN SCALES - GENERAL: PAINLEVEL_OUTOF10: 10

## 2021-03-23 ASSESSMENT — PAIN DESCRIPTION - ONSET: ONSET: ON-GOING

## 2021-03-23 ASSESSMENT — PAIN DESCRIPTION - PAIN TYPE: TYPE: SURGICAL PAIN

## 2021-03-23 NOTE — PROGRESS NOTES
[facility]  PHYSICAL THERAPY EVALUATION    Date: 3/23/2021  Patient Name: Yareli Dykes       MRN: [id number]   Account: [de-identified]   : 1970  (53 y.o.)   Gender: female   Referring Practitioner: Reece Maddox M.D. Treatment Diagnosis: S/P left distal bicep repair  Additional Pertinent Hx: DOI was in 2020 and finally had surgery in 2021  Restrictions/Precautions: Surgical Protocols  Required Braces or Orthoses?: No     Spinal Precautions: No Lifting  Other position/activity restrictions: Limited lifting with the left elbow per protocol no more than 5#,  She is 9 weeks post op    Past Medical History:  has a past medical history of Asthma and Thyroid disease. Past Surgical History:   has a past surgical history that includes [place] tooth extraction; Wrist fusion (Bilateral); Back Injection; and Biceps tendon repair (Left, 1/15/2021). Vital Signs  Patient Currently in Pain: Yes   Pain Screening  Patient Currently in Pain: Yes  Pain Assessment  Pain Assessment: 0-10(0/10 when quiet and 10/10 when supinating)  Pain Level: 10  Patient's Stated Pain Goal: No pain  Pain Type: Surgical pain  Pain Location: Elbow  Pain Orientation: Left  Pain Radiating Towards: Triceps  area and down to the wrist  Pain Descriptors: Aching; Sharp  Pain Frequency: Intermittent  Pain Onset: On-going  Clinical Progression: Not changed  Functional Pain Assessment: Prevents or interferes with many active not passive activities                Lives With: Family  Type of Home: House  ADL Assistance: Independent  Homemaking Assistance: Independent  Ambulation Assistance: Independent  Active : Yes  Mode of Transportation: Car  Type of occupation:  and [employer]        Subjective:  Subjective: Patient reports that her mechanism of injury occured 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  She felt immediate pain in her volar Patient limited by pain     Decision Making: Medium Complexity          Plan  Frequency/Duration:  Plan  Times per week: 2  Plan weeks: 6-8  Current Treatment Recommendations: Strengthening, ROM, Pain Management, Modalities(Work activities)         Patient Education  New Education Provided: PT Education: Goals;PT Role;Plan of Care;Home Exercise Program  Patient Education: Use putty at home for hand strengthening    POST-PAIN     Pain Rating (0-10 pain scale):   9/10  Location and pain description same as pre-treatment unless indicated. Action: [] NA  [] Call Physician  [x] Perform HEP  [x] Meds as prescribed    Evaluation and patient rights have been reviewed and patient agrees with plan of care. Yes  [x]  No  []   Explain:     [place] Fall Risk Assessment  Risk Factor Scale  Score   History of Falls [] Yes  [x] No 25  0 0   Secondary Diagnosis [] Yes  [x] No 15  0 0   Ambulatory Aid [] Furniture  [] Crutches/cane/walker  [x] None/bedrest/wheelchair/nurse 30  15  0 0   IV/Heparin Lock [] Yes  [x] No 20  0 0   Gait/Transferring [] Impaired  [] Weak  [x] Normal/bedrest/immobile 20  10  0 0   Mental Status [] Forgets limitations  [x] Oriented to own ability 15  0 0      Total: 0     Based on the Assessment score: check the appropriate box. [x]  No intervention needed   Low =   Score of 0-24  []  Use standard prevention interventions Moderate =  Score of 24-44   [] Discuss fall prevention strategies   [] Indicate moderate falls risk on eval  []  Use high risk prevention interventions High = Score of 45 and higher   [] Discuss fall prevention strategies   [] Provide supervision during treatment time  Goals  Short term goals  Time Frame for Short term goals: 3-7  Short term goal 1: Increase range of motion of the left shoulder to full motion all planes  Short term goal 2: Increase left elbow supination 0-90 degrees  Short term goal 3: Pain decreased with activity to 5/10  Short term goal 4:  Increase  strength by 10 to 20 lbs per each measurement on the left  Long term goals  Time Frame for Long term goals : 8-24  Long term goal 1: Left elbow full range of motion  Long term goal 2: Strength of the left shoulder 5/5  Long term goal 3: Strength of left elbow 5/5  Long term goal 4: No further pain with use of the left elbow  Long term goal 5: Patient able to go back to full duty work        Treatment Initiated : Moist heat to the left elbow followed by range of moiton left elbow and shoulder.   Putty for hand strengthening    PT Individual Minutes  Time In: 1400  Time Out: 1500  Minutes: 60  Timed Code Treatment Minutes: 40 Minutes  Procedure Minutes:     Timed Activity Minutes code In/out Units   Ther Ex 20 27806 [id number] 1   Eval 30 28349 [id number] 1   Moist heat 10  [id number]        Electronically signed by Stanford Hollingsworth PT on 3/23/21 at 5:17 PM EDT

## 2021-03-25 ENCOUNTER — HOSPITAL ENCOUNTER (OUTPATIENT)
Dept: PHYSICAL THERAPY | Age: 51
Setting detail: THERAPIES SERIES
Discharge: HOME | End: 2021-03-25
Payer: COMMERCIAL

## 2021-03-25 PROCEDURE — 97110 THERAPEUTIC EXERCISES: CPT | Performed by: PHYSICAL THERAPIST

## 2021-03-25 ASSESSMENT — PAIN DESCRIPTION - PAIN TYPE: TYPE: SURGICAL PAIN

## 2021-03-25 ASSESSMENT — PAIN - FUNCTIONAL ASSESSMENT: PAIN_FUNCTIONAL_ASSESSMENT: PREVENTS OR INTERFERES WITH ALL ACTIVE AND SOME PASSIVE ACTIVITIES

## 2021-03-25 ASSESSMENT — PAIN DESCRIPTION - ORIENTATION: ORIENTATION: LEFT

## 2021-03-25 ASSESSMENT — PAIN DESCRIPTION - FREQUENCY: FREQUENCY: INTERMITTENT

## 2021-03-25 ASSESSMENT — PAIN DESCRIPTION - LOCATION: LOCATION: ELBOW

## 2021-03-25 ASSESSMENT — PAIN DESCRIPTION - DESCRIPTORS: DESCRIPTORS: ACHING;PRESSURE

## 2021-03-25 ASSESSMENT — PAIN DESCRIPTION - ONSET: ONSET: ON-GOING

## 2021-03-25 NOTE — PROGRESS NOTES
weights. Prognosis: Excellent      Patient Education: Use putty at home for hand strengthening    PLAN: [] Evaluate and Treat  Frequency/Duration:  Plan  Times per week: 2  Plan weeks: 6-8  Current Treatment Recommendations: Strengthening, ROM, Pain Management, Modalities     Precautions:                            Patient Status:[] Continue/ Initiate plan of Care    [] Discharge PT. Recommend pt continue with HEP. [] Additional visits requested, Please re-certify for additional visits:          Signature: Electronically signed by Lisa Sanchez PT on 3/25/21 at 3:06 PM EDT      If you have any questions or concerns, please don't hesitate to call. Thank you for your referral.    I have reviewed this plan of care and certify a need for medically necessary rehabilitation services.     Physician Signature:__________________________________________________________  Date:  Please sign and return

## 2021-03-25 NOTE — PROGRESS NOTES
[address] [facility]   Treatment Note                                          Date: 3/25/2021  Patient: Erika Mclean  : 1970  ACCT #: [de-identified]  Referring Practitioner: Nichol Pinzon M.D. Visit Information:  PT Visit Information  Onset Date: 21  Total # of Visits Approved: 24  Plan of Care/Certification Expiration Date: 21  Progress Note Counter:     Subjective: Patient states that she feels a pressure sensation when she does supination manuever, otherwise her elbow feels good. HEP Compliance:  [x] Good [] Fair [] Poor [] Reports not doing due to:    Vital Signs  Patient Currently in Pain: Yes   Pain Screening  Patient Currently in Pain: Yes  Pain Assessment  Pain Assessment: 0-10  Pain Level: 4  Patient's Stated Pain Goal: No pain  Pain Type: Surgical pain  Pain Location: Elbow  Pain Orientation: Left  Pain Descriptors: Aching;Pressure  Pain Frequency: Intermittent  Pain Onset: On-going  Clinical Progression: Gradually improving  Functional Pain Assessment: Prevents or interferes with all active and some passive activities    OBJECTIVE:   Exercises  Exercise 2: Active range of motion of elbow flexion, extension, pronation, supination  Exercise 3: Supine shoulder flexion, abduction and external and internal rotation  Exercise 4: Sitting elbow flexion with 1# weight  Exercise 5: Supine wand flexion and protraction  Exercise 6: Wall climbs bilateral shoulders  Exercise 7: UBE level 1 2 minutes forward and 2 minutes backwards. Exercise 8: Red and yellow clips for arm flexion         Manual:   Manual therapy  Soft Tissue Mobalization: soft tissue mobilization near scars    Modalities:  Modalities  Moist heat: Moist heat prior to exercises  Cryotherapy (Minutes\Location):  Ice pack to the elbow after exercise     *Indicates exercise, modality, or manual techniques to be initiated when appropriate    Assessment:   Activity Tolerance  Activity Tolerance: Patient tolerated 1# weight for strengthening and UBE without increase in pain    Body structures, Functions, Activity limitations: Decreased ROM, Decreased strength, Increased pain  Assessment: Patient tolerated more exercises today including light weights. Treatment Diagnosis: S/P left distal bicep repair  Prognosis: Excellent  Patient Education: Use putty at home for hand strengthening    Goals:  Short term goals  Time Frame for Short term goals: 3-7  Short term goal 1: Increase range of motion of the left shoulder to full motion all planes  Short term goal 2: Increase left elbow supination 0-90 degrees  Short term goal 3: Pain decreased with activity to 5/10  Short term goal 4: Increase  strength by 10 to 20 lbs per each measurement on the left       Progress toward goals: Good    POST-PAIN       Pain Rating (0-10 pain scale):  4 /10   Location and pain description same as pre-treatment unless indicated. Action: [] NA   [x] Perform HEP  [] Meds as prescribed  [] Modalities as prescribed   [] Call Physician     Frequency/Duration:  Plan  Times per week: 2  Plan weeks: 6-8  Current Treatment Recommendations: Strengthening, ROM, Pain Management, Modalities     Pt to continue current HEP. See objective section for any therapeutic exercise changes, additions or modifications this date.          PT Individual Minutes  Time In: 1000  Time Out: 1050  Minutes: 50  Timed Code Treatment Minutes: 40 Minutes  Procedure Minutes:     Timed Activity Minutes code In/out Units   Ther Ex 40 [id number] 3   Moist heat 10  [id number]        Signature:  Electronically signed by Isaiah Peres PT on 3/25/21 at 12:00 PM EDT

## 2021-03-30 ENCOUNTER — HOSPITAL ENCOUNTER (OUTPATIENT)
Dept: PHYSICAL THERAPY | Age: 51
Setting detail: THERAPIES SERIES
Discharge: HOME | End: 2021-03-30
Payer: COMMERCIAL

## 2021-03-30 PROCEDURE — 97110 THERAPEUTIC EXERCISES: CPT | Performed by: PHYSICAL THERAPIST

## 2021-03-30 ASSESSMENT — PAIN SCALES - GENERAL: PAINLEVEL_OUTOF10: 5

## 2021-03-30 ASSESSMENT — PAIN DESCRIPTION - FREQUENCY: FREQUENCY: INTERMITTENT

## 2021-03-30 ASSESSMENT — PAIN DESCRIPTION - DESCRIPTORS: DESCRIPTORS: ACHING

## 2021-03-30 ASSESSMENT — PAIN DESCRIPTION - LOCATION: LOCATION: ELBOW

## 2021-03-30 NOTE — PROGRESS NOTES
[id number] [address]   Treatment Note                                          Date: 3/30/2021  Patient: Anita Enriquez  : 1970  ACCT #: [de-identified]  Referring Practitioner: Brandy Love M.D. Visit Information:  PT Visit Information  Onset Date: 21  Total # of Visits Approved: 24  Plan of Care/Certification Expiration Date: 21  Progress Note Counter: 3/24    Subjective: Turning her hand out (supination) is still painful, also having pain lateral aspect of the bicep in the upper arm. HEP Compliance:  [x] Good [] Fair [] Poor [] Reports not doing due to:    Vital Signs  Patient Currently in Pain: Yes   Pain Screening  Patient Currently in Pain: Yes  Pain Assessment  Pain Assessment: 0-10  Pain Level: 5  Patient's Stated Pain Goal: No pain  Pain Type: Surgical pain  Pain Location: Elbow  Pain Orientation: Left  Pain Descriptors: Aching  Pain Frequency: Intermittent  Pain Onset: On-going  Clinical Progression: Gradually improving  Functional Pain Assessment: Prevents or interferes with all active and some passive activities    OBJECTIVE:   Exercises  Exercise 2: Active range of motion of elbow flexion, extension, pronation, supination  Exercise 4: Sitting elbow flexion with 3# weight  Exercise 7: UBE level 1 2 minutes forward and 2 minutes backwards.   Exercise 9: Wire wrist exerciser  Exercise 10: resisted wrist exerciser       ROM: [] NT  [x] ROM measurements:   AROM LUE (degrees)  L Shoulder Flexion 0-180: 0-160  L Shoulder Extension 0-45: 0-40  L Shoulder ABduction 0-180: 0-160  L Shoulder Int Rotation  0-70: 0-50  L Shoulder Ext Rotation  0-90: 0-70  L Elbow Flexion 0-145: 0-140  L Elbow Extension 145-0: 0  L Forearm Pron 0-90: 0-90  L Forearm Supination  0-90: 0-45  L Wrist Flexion 0-80: 0-80  L Wrist Extension 0-70: 0-70  L Wrist Radial Deviation 0-20: 0-20  L Wrist Ulnar Deviation 0-45: 0-45     Modalities:  Modalities  Moist heat: Moist heat prior to exercises  Cryotherapy (Minutes\Location): Ice pack to the elbow after exercise     *Indicates exercise, modality, or manual techniques to be initiated when appropriate    Assessment:   Activity Tolerance  Activity Tolerance: Patient Tolerated treatment well  Activity Tolerance: Patient tolerated 3# for elbow exercise. Body structures, Functions, Activity limitations: Decreased ROM, Decreased strength, Increased pain  Assessment: Improvement noted in supination of the elbow. Patient is able to increase weights today for bicep curls  Treatment Diagnosis: S/P left distal bicep repair  Prognosis: Excellent  Patient Education: Patient has been using a 5# for bicep curls at home, and requested she not do this yet. Goals:  Short term goals  Time Frame for Short term goals: 3-7  Short term goal 1: Increase range of motion of the left shoulder to full motion all planes  Short term goal 2: Increase left elbow supination 0-90 degrees  Short term goal 3: Pain decreased with activity to 5/10  Short term goal 4: Increase  strength by 10 to 20 lbs per each measurement on the left       Progress toward goals:    POST-PAIN       Pain Rating (0-10 pain scale):  5 /10   Location and pain description same as pre-treatment unless indicated. Action: [] NA   [] Perform HEP  [] Meds as prescribed  [] Modalities as prescribed   [] Call Physician     Frequency/Duration:  Plan  Times per week: 2  Current Treatment Recommendations: Strengthening, ROM, Pain Management, Modalities     Pt to continue current HEP. See objective section for any therapeutic exercise changes, additions or modifications this date.          PT Individual Minutes  Time In: 0382  Time Out: 2152  Minutes: 45  Timed Code Treatment Minutes: 35 Minutes  Procedure Minutes:     Timed Activity Minutes code In/out Units   Ther Ex 35 [id number] [phone] 2   Moist heat 10  1555-7834        Signature:  Electronically signed by Lisa Sanchez PT on 3/30/21 at 5:35 PM EDT

## 2021-04-01 ENCOUNTER — HOSPITAL ENCOUNTER (OUTPATIENT)
Dept: PHYSICAL THERAPY | Age: 51
Setting detail: THERAPIES SERIES
Discharge: HOME | End: 2021-04-01
Payer: COMMERCIAL

## 2021-04-01 PROCEDURE — 97140 MANUAL THERAPY 1/> REGIONS: CPT

## 2021-04-01 PROCEDURE — 97110 THERAPEUTIC EXERCISES: CPT

## 2021-04-01 ASSESSMENT — PAIN DESCRIPTION - LOCATION: LOCATION: ARM;ELBOW

## 2021-04-01 ASSESSMENT — PAIN DESCRIPTION - ORIENTATION: ORIENTATION: LEFT

## 2021-04-01 ASSESSMENT — PAIN DESCRIPTION - DESCRIPTORS: DESCRIPTORS: SORE

## 2021-04-01 NOTE — PROGRESS NOTES
[address] [facility]  Outpatient Physical Therapy    Treatment Note        Date: 2021  Patient: Zainab Chacon  : 1970  ACCT #: [de-identified]  Referring Practitioner: Katelynn Mendez M.D. Diagnosis: Biceps rupture, distal, left, subsequent encounter    Visit Information:  PT Visit Information  Onset Date: 21  PT Insurance Information: [id number]- claim #[id number]  Total # of Visits Approved: 24  Total # of Visits to Date: 1  Plan of Care/Certification Expiration Date: 21  No Show: 0  Canceled Appointment: 0  Progress Note Counter:     Subjective: Pt reports having increased sorensss throughout Upper arm and contd difficulty and pain with performing supination activity. HEP Compliance:  [x] Good [] Fair [] Poor [] Reports not doing due to:    Vital Signs  Patient Currently in Pain: Yes   Pain Screening  Patient Currently in Pain: Yes  Pain Assessment  Pain Assessment: 0-10  Pain Level: 3  Pain Type: Surgical pain  Pain Location: Arm;Elbow  Pain Orientation: Left  Pain Descriptors: Sore    OBJECTIVE:   Exercises  Exercise 1: eccentric weighted curls with 3# (using R arm to perform flexion- 5-7\" slow lowering into extension) x10  Exercise 2: AROM supination 5\"x10 with elbow flexed and extended  Exercise 3: tband tricep extension YTB x10  Exercise 4: Sitting elbow flexion 3 way (thumb up, palm up, plam down) 2# x15 ea. Exercise 7: UBE level 1 2 minutes forward and 2 minutes backwards.   Exercise 9: Wire wrist exerciser x10 reps  Exercise 10: resisted wrist exerciser x20 reps 2 way    Strength: [x] NT  [] MMT completed:    ROM: [x] NT  [] ROM measurements:    Manual:   Manual therapy  Soft Tissue Mobalization: soft tissue/trigger point release to L bicep and cross friction scar mobs to proximal incision; total x 12 mins    Modalities:  Modalities  Moist heat: Moist heat prior x10 min to L elbow for post exertion pain control and mm relaxation     *Indicates exercise, modality, or manual techniques to be initiated when appropriate    Assessment: Body structures, Functions, Activity limitations: Decreased ROM, Decreased strength, Increased pain  Assessment: Pt demos contd tightness and palpable mm triggerpoints throughout L distal/lateral bicep addressed with manual this date with some soreness noted though overall good tolerance. Pt requires VC's to perform ther ex in non-pain producting ROM with good carryover. Added tband tricep extension and eccentric bicep curl to further improve L UE strength with tissue elongation and improve tendon healing. Treatment Diagnosis: S/P left distal bicep repair  Prognosis: Excellent     Goals:  Short term goals  Time Frame for Short term goals: 3-7  Short term goal 1: Increase range of motion of the left shoulder to full motion all planes  Short term goal 2: Increase left elbow supination 0-90 degrees  Short term goal 3: Pain decreased with activity to 5/10  Short term goal 4: Increase  strength by 10 to 20 lbs per each measurement on the left    Long term goals  Time Frame for Long term goals : 8-24  Long term goal 1: Left elbow full range of motion  Long term goal 2: Strength of the left shoulder 5/5  Long term goal 4: No further pain with use of the left elbow  Long term goal 5: Patient able to go back to full duty work  Progress toward goals:good with progressions of exercises     POST-PAIN       Pain Rating (0-10 pain scale):   0/10   Location and pain description same as pre-treatment unless indicated. Action: [] NA   [x] Perform HEP  [] Meds as prescribed  [x] Modalities as prescribed   [] Call Physician     Frequency/Duration:  Plan  Times per week: 2  Plan weeks: 6-8  Current Treatment Recommendations: Strengthening, ROM, Pain Management, Modalities, Manual Therapy - Soft Tissue Mobilization  Plan Comment: manual added to program this date     Pt to continue current HEP.   See objective section for any therapeutic exercise changes, additions or modifications this date.     PT Individual Minutes  Time In: 6261  Time Out: 1003  Minutes: 49  Timed Code Treatment Minutes: 39 Minutes  Procedure Minutes: 10 MHP     Modality Time In Time Out Total Minutes Units   Ther ex (68200) 2840 [phone]   Manual Therapy (67053) [id number] 1       Signature:  Electronically signed by Sandra Barnett, PT on 4/1/21 at 9:56 AM EDT

## 2021-04-05 ENCOUNTER — OFFICE VISIT (OUTPATIENT)
Dept: ORTHOPEDIC SURGERY | Age: 51
End: 2021-04-05

## 2021-04-05 VITALS
BODY MASS INDEX: 23.39 KG/M2 | HEIGHT: 67 IN | WEIGHT: 149 LBS | HEART RATE: 92 BPM | OXYGEN SATURATION: 99 % | TEMPERATURE: 96.8 F

## 2021-04-05 DIAGNOSIS — S46.212D BICEPS RUPTURE, DISTAL, LEFT, SUBSEQUENT ENCOUNTER: Primary | ICD-10-CM

## 2021-04-05 PROCEDURE — 99024 POSTOP FOLLOW-UP VISIT: CPT | Performed by: PHYSICIAN ASSISTANT

## 2021-04-05 NOTE — PROGRESS NOTES
status: Not on file    Intimate partner violence     Fear of current or ex partner: Not on file     Emotionally abused: Not on file     Physically abused: Not on file     Forced sexual activity: Not on file   Other Topics Concern    Not on file   Social History Narrative    Not on file     No family history on file. Allergies   Allergen Reactions    Azithromycin Other (See Comments)     BRUISING    Clarithromycin      Other reaction(s): Unknown  Causes Bruises    Molds & Smuts      Other reaction(s): Intolerance    Seasonal      Other reaction(s): Intolerance     Current Outpatient Medications on File Prior to Visit   Medication Sig Dispense Refill    levothyroxine (SYNTHROID) 125 MCG tablet Take 1 tablet by mouth Daily 60 tablet 2    albuterol sulfate  (90 Base) MCG/ACT inhaler INHALE 2 (TWO) puffs EVERY 4 HOURS AS NEEDED 1 Inhaler 3    MELATONIN GUMMIES PO Take by mouth daily as needed      indomethacin (INDOCIN) 25 MG capsule Take 1 capsule by mouth 2 times daily (with meals) for 10 days 20 capsule 0    indomethacin (INDOCIN) 25 MG capsule Take 1 capsule by mouth 2 times daily (with meals) 20 capsule 0    fluticasone (FLONASE) 50 MCG/ACT nasal spray 2 sprays by Nasal route daily 1 Bottle 0    doxyLAMINE succinate (GNP SLEEP AID) 25 MG tablet Take 1 tablet by mouth nightly as needed for Sleep 30 tablet 1    Multiple Vitamin (MVI, CELEBRATE, CHEWABLE TABLET) Take 1 tablet by mouth daily       No current facility-administered medications on file prior to visit. Objective: There were no vitals taken for this visit. Radiographs and Laboratory Studies:   Previous diagnostic imaging studies were reviewed.        Laboratory Studies:   Lab Results   Component Value Date    WBC 6.6 01/13/2021    HGB 12.9 01/13/2021    HCT 38.8 01/13/2021    MCV 89.3 01/13/2021     01/13/2021     No results found for: SEDRATE  No results found for: CRP    Assessment and Plan:      Diagnosis Orders 1. Biceps rupture, distal, left, subsequent encounter         Doing well with her postoperative course. She has great ROM. At this point we will begin to with strength training, start a 5 pound dumbbell advancing up to a maximum of 10. Prior to the injury she was doing a pound dumbbells. She has some residual clicking at the proximal radius mainly with hyperextension. She does feel some tightness and pain there. Likely residual scar tissue. 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 She will continue working with therapy for the next 6 weeks we will see her back in the time to assess her progress. The above plan was discussed in thorough detail with Dr. Sandro Kowalski and the patient. No orders of the defined types were placed in this encounter. No orders of the defined types were placed in this encounter. No follow-ups on file.     Hawa Diane PA-C  [facility]  [phone]

## 2021-04-06 ENCOUNTER — HOSPITAL ENCOUNTER (OUTPATIENT)
Dept: PHYSICAL THERAPY | Age: 51
Setting detail: THERAPIES SERIES
Discharge: HOME | End: 2021-04-06
Payer: COMMERCIAL

## 2021-04-06 PROCEDURE — 97124 MASSAGE THERAPY: CPT | Performed by: PHYSICAL THERAPIST

## 2021-04-06 PROCEDURE — 97110 THERAPEUTIC EXERCISES: CPT | Performed by: PHYSICAL THERAPIST

## 2021-04-06 ASSESSMENT — PAIN DESCRIPTION - FREQUENCY: FREQUENCY: INTERMITTENT

## 2021-04-06 ASSESSMENT — PAIN DESCRIPTION - ORIENTATION: ORIENTATION: LEFT

## 2021-04-06 ASSESSMENT — PAIN DESCRIPTION - LOCATION: LOCATION: ARM;ELBOW

## 2021-04-06 ASSESSMENT — PAIN DESCRIPTION - DESCRIPTORS: DESCRIPTORS: ACHING

## 2021-04-06 ASSESSMENT — PAIN DESCRIPTION - PAIN TYPE: TYPE: SURGICAL PAIN

## 2021-04-06 NOTE — PROGRESS NOTES
[address] [address]   Treatment Note                                          Date: 2021  Patient: Rut Goodrich  : 1970  ACCT #: [de-identified]  Referring Practitioner: Kira Montelongo M.D. Diagnosis: Biceps rupture, distal, left, subsequent encounter    Visit Information:  PT Visit Information  Onset Date: 21  PT Insurance Information: [facility]- claim #[id number]  Total # of Visits Approved: 24  Total # of Visits to Date: 5  Plan of Care/Certification Expiration Date: 21  Progress Note Counter:     Subjective: Patient reports that she saw the ortho and in the next 6 weeks he wants her to be able to lift 10# bicep curls. HEP Compliance:  [x] Good [] Fair [] Poor [] Reports not doing due to:    Vital Signs  Patient Currently in Pain: Yes   Pain Screening  Patient Currently in Pain: Yes  Pain Assessment  Pain Assessment: 0-10  Pain Level: 7(With passive overpressure in supination)  Patient's Stated Pain Goal: No pain  Pain Type: Surgical pain  Pain Location: Arm;Elbow  Pain Orientation: Left  Pain Radiating Towards: Lateral part of right upper arm  Pain Descriptors: Aching(Pressure pain has resolved)  Pain Frequency: Intermittent  Pain Onset: On-going  Clinical Progression: Gradually improving  Functional Pain Assessment: Prevents or interferes with many active not passive activities    OBJECTIVE:   Exercises  Exercise 1: eccentric weighted curls with 3# (using R arm to perform flexion- 5-7\" slow lowering into extension) x10  Exercise 2: AROM supination 5\"x10 with elbow flexed and extended  Exercise 3: tband tricep extension YTB x10  Exercise 4: Sitting elbow flexion 3 way (thumb up, palm up, plam down) 2# x15 ea. Exercise 5: Supine wand flexion and protraction  Exercise 6: Wall climbs bilateral shoulders  Exercise 7: UBE level 2 2 minutes forward and 2 minutes backwards. Exercise 11: Resisted exerciser for supination. 10 reps         ROM: [] NT  [x] ROM measurements:         AROM LUE (degrees)  L Shoulder Flexion 0-180: 0-180  L Shoulder Extension 0-45: 0-45  L Shoulder ABduction 0-180: 0-180  L Elbow Flexion 0-145: 0-148  L Elbow Extension 145-0: 0  L Forearm Supination  0-90: 0-60  L Wrist Flexion 0-80: 0-80  L Wrist Extension 0-70: 0-70  L Wrist Radial Deviation 0-20: 0-20  L Wrist Ulnar Deviation 0-45: 0-45     Manual:   Manual therapy  Soft Tissue Mobalization: soft tissue/trigger point release to L bicep and cross friction scar mobs to proximal incision; total x 12 mins    Modalities:  Modalities  Moist heat: Moist heat prior x10 min to L elbow for post exertion pain control and mm relaxation  Cryotherapy (Minutes\Location): Ice pack to the elbow after exercise     *Indicates exercise, modality, or manual techniques to be initiated when appropriate    Assessment:   Activity Tolerance  Activity Tolerance: Patient Tolerated treatment well  Activity Tolerance: Tolerated further supination exercises, and increase in bicep strengthening. Assessment: Patient does report pain with eccentric bicep exercises, and with supination. Soft tissue restrictions are lessening. Patient needs verbal cues to slow exercise execution down as see tends to do them too fast.  Treatment Diagnosis: S/P left distal bicep repair  Prognosis: Excellent  PT Education: Home Exercise Program  Patient Education: Given yellow theraband for tricep ex,    Goals:  Short term goals  Time Frame for Short term goals: 3-7  Short term goal 1: Increase range of motion of the left shoulder to full motion all planes (goal met)  Short term goal 2: Increase left elbow supination 0-90 degrees  Short term goal 3: Pain decreased with activity to 5/10  Short term goal 4:  Increase  strength by 10 to 20 lbs per each measurement on the left    Long term goals  Time Frame for Long term goals : 8-24  Long term goal 1: Left elbow full range of motion  Long term goal 2: Strength of the left shoulder 5/5  Long term goal 3: Strength of left elbow 5/5  Long term goal 4: No further pain with use of the left elbow  Long term goal 5: Patient able to go back to full duty work  Progress toward goals:    POST-PAIN       Pain Rating (0-10 pain scale):  0 /10   Location and pain description same as pre-treatment unless indicated. Action: [] NA   [x] Perform HEP  [] Meds as prescribed  [] Modalities as prescribed   [] Call Physician     Frequency/Duration:  Plan  Times per week: 2  Current Treatment Recommendations: Strengthening, ROM, Pain Management, Modalities, Manual Therapy - Soft Tissue Mobilization     Pt to continue current HEP. See objective section for any therapeutic exercise changes, additions or modifications this date.          PT Individual Minutes  Time In: 1500  Time Out: 1550  Minutes: 50  Timed Code Treatment Minutes: 30 Minutes  Procedure Minutes:     Timed Activity Minutes code In/out Units   Ther Ex 20 [id number] [id number] 1   Moist heat 10  5029-2907    Ice 10  5232-2626    Massage 10 02475 [phone] 1       Signature:  Electronically signed by Hung Villatoro PT on 4/6/21 at 3:51 PM EDT

## 2021-04-08 ENCOUNTER — HOSPITAL ENCOUNTER (OUTPATIENT)
Dept: PHYSICAL THERAPY | Age: 51
Setting detail: THERAPIES SERIES
Discharge: HOME | End: 2021-04-08
Payer: COMMERCIAL

## 2021-04-08 PROCEDURE — 97110 THERAPEUTIC EXERCISES: CPT | Performed by: PHYSICAL THERAPIST

## 2021-04-08 PROCEDURE — 97124 MASSAGE THERAPY: CPT | Performed by: PHYSICAL THERAPIST

## 2021-04-08 ASSESSMENT — PAIN DESCRIPTION - PROGRESSION: CLINICAL_PROGRESSION: GRADUALLY IMPROVING

## 2021-04-08 ASSESSMENT — PAIN DESCRIPTION - ONSET: ONSET: ON-GOING

## 2021-04-08 ASSESSMENT — PAIN DESCRIPTION - ORIENTATION: ORIENTATION: LEFT

## 2021-04-08 ASSESSMENT — PAIN DESCRIPTION - PAIN TYPE: TYPE: SURGICAL PAIN

## 2021-04-08 ASSESSMENT — PAIN DESCRIPTION - LOCATION: LOCATION: ABDOMEN;ELBOW

## 2021-04-08 ASSESSMENT — PAIN DESCRIPTION - FREQUENCY: FREQUENCY: INTERMITTENT

## 2021-04-08 ASSESSMENT — PAIN DESCRIPTION - DESCRIPTORS: DESCRIPTORS: ACHING

## 2021-04-08 NOTE — PROGRESS NOTES
[id number] [address]   Treatment Note                                          Date: 2021  Patient: Eduardo Valladares  : 1970  ACCT #: [de-identified]  Referring Practitioner: Efe Flores M.D. Diagnosis: Biceps rupture, distal, left, subsequent encounter    Visit Information:  PT Visit Information  Onset Date: 21  PT Insurance Information: [facility]- claim #[id number]  Total # of Visits Approved: 24  Total # of Visits to Date: 6  Plan of Care/Certification Expiration Date: 21  Progress Note Counter:     Subjective: Patient states she went to the gym yesterday and did some aerobic exercise, and some exercises for her arm. HEP Compliance:  [x] Good [] Fair [] Poor [] Reports not doing due to:    Vital Signs  Patient Currently in Pain: Yes   Pain Screening  Patient Currently in Pain: Yes  Pain Assessment  Pain Assessment: 0-10  Pain Level: 5  Patient's Stated Pain Goal: No pain  Pain Type: Surgical pain  Pain Location: Abdomen;Elbow  Pain Orientation: Left  Pain Radiating Towards: Lateral part of right upper arm  Pain Descriptors: Aching  Pain Frequency: Intermittent  Pain Onset: On-going  Clinical Progression: Gradually improving    OBJECTIVE:   Exercises  Exercise 2: AROM supination 5\"x10 with elbow flexed and extended  Exercise 4: Sitting elbow flexion 3 way (thumb up, palm up, plam down) 3# x15 ea. Exercise 7: UBE level 2 2 minutes forward and 2 minutes backwards. Exercise 8: Green, blue and black clips for arm flexion  Exercise 9: Wire wrist exerciser x10 reps  Exercise 10: resisted wrist exerciser x20 reps 2 way  Exercise 11: Resisted exerciser for supination. 10 reps  Exercise 12: Resisted pulleys for tricep  3 plates, bicep 1 plate       ROM: [] NT  [x] ROM measurements:         AROM RUE (degrees)  RUE AROM : WNL     AROM LUE (degrees)  L Shoulder Flexion 0-180: 0-180  L Shoulder ABduction 0-180: 0-180  L Shoulder Int Rotation  0-70: 0-50  L Elbow Flexion 0-145: 0-148  L Elbow Extension 145-0: 0  L Forearm Pron 0-90: 0-90  L Forearm Supination  0-90: 0-80  L Wrist Extension 0-70: 0-70  L Wrist Radial Deviation 0-20: 0-20  L Wrist Ulnar Deviation 0-45: 0-45     Manual:   Manual therapy  Soft Tissue Mobalization: soft tissue/trigger point release to L bicep and cross friction scar mobs to proximal incision; total x 10 minutes    Modalities:  Modalities  Moist heat: Moist heat prior x10 min to L elbow for post exertion pain control and mm relaxation  Cryotherapy (Minutes\Location): Ice pack to the elbow after exercise     *Indicates exercise, modality, or manual techniques to be initiated when appropriate    Assessment:   Activity Tolerance  Activity Tolerance: Patient Tolerated treatment well       Assessment: Patient does report pain with eccentric bicep exercises, and with supination. Soft tissue restrictions are lessening. Patient needs verbal cues to slow exercise execution down as see tends to do them too fast.  Treatment Diagnosis: S/P left distal bicep repair  Prognosis: Excellent  PT Education: Home Exercise Program  Patient Education: Given yellow theraband for tricep ex,    Goals:  Short term goals  Time Frame for Short term goals: 3-7  Short term goal 1: Increase range of motion of the left shoulder to full motion all planes (goal met)  Short term goal 2: Increase left elbow supination 0-90 degrees  Short term goal 3: Pain decreased with activity to 5/10  Short term goal 4:  Increase  strength by 10 to 20 lbs per each measurement on the left    Long term goals  Time Frame for Long term goals : 8-24  Long term goal 1: Left elbow full range of motion  Long term goal 2: Strength of the left shoulder 5/5  Long term goal 3: Strength of left elbow 5/5  Long term goal 4: No further pain with use of the left elbow  Long term goal 5: Patient able to go back to full duty work  Progress toward goals:    POST-PAIN       Pain Rating (0-10 pain scale):   5/10   Location and pain description same as pre-treatment unless indicated. Action: [] NA   [x] Perform HEP  [] Meds as prescribed  [] Modalities as prescribed   [] Call Physician     Frequency/Duration:  Plan  Times per week: 2  Plan weeks: 6-8  Current Treatment Recommendations: Strengthening, ROM, Pain Management, Modalities, Manual Therapy - Soft Tissue Mobilization     Pt to continue current HEP. See objective section for any therapeutic exercise changes, additions or modifications this date.          PT Individual Minutes  Time In: 0930  Time Out: 1020  Minutes: 50  Timed Code Treatment Minutes: 30 Minutes  Procedure Minutes:     Timed Activity Minutes code In/out Units   Moist heat 10  2756-3080    Ther Ex 20 [id number] [id number] 1   Massage 10 [id number] [phone] 1       Signature:  Electronically signed by Morgan Severance, PT on 4/8/21 at 10:32 AM EDT

## 2021-04-13 ENCOUNTER — HOSPITAL ENCOUNTER (OUTPATIENT)
Dept: PHYSICAL THERAPY | Age: 51
Setting detail: THERAPIES SERIES
Discharge: HOME | End: 2021-04-13
Payer: COMMERCIAL

## 2021-04-13 PROCEDURE — 97110 THERAPEUTIC EXERCISES: CPT

## 2021-04-13 ASSESSMENT — PAIN DESCRIPTION - PAIN TYPE: TYPE: SURGICAL PAIN

## 2021-04-13 NOTE — PROGRESS NOTES
[address] [facility]  Outpatient Physical Therapy    Treatment Note        Date: 2021  Patient: Ziggy Sherman  : 1970  ACCT #: [de-identified]  Referring Practitioner: Liz Diaz M.D. Diagnosis: Biceps rupture, distal, left, subsequent encounter    Visit Information:  PT Visit Information  Onset Date: 21  PT Insurance Information: [facility]- claim #[id number]  Total # of Visits Approved: 24  Total # of Visits to Date: 7  Plan of Care/Certification Expiration Date: 21  No Show: 0  Canceled Appointment: 0  Progress Note Counter:     Subjective: Pt denies pain at rest though increases to 6/10 with supination activities. HEP Compliance:  [x] Good [] Fair [] Poor [] Reports not doing due to:    Vital Signs  Patient Currently in Pain: Yes(see above)   Pain Screening  Patient Currently in Pain: Yes(see above)  Pain Assessment  Pain Type: Surgical pain    OBJECTIVE:   Exercises  Exercise 2: AROM supination 5\"x10 with elbow flexed and extended  Exercise 4: Sitting elbow flexion 3 way (thumb up, palm up, plam down) 3# x15 ea. Exercise 7: UBE level 2 2.5' minutes forward and 2.5' minutes backwards. Exercise 8: black clips for arm flexion x3 reps  Exercise 9: Wire wrist exerciser x15 reps- performed with elbow extended vs flexed  Exercise 10: resisted wrist exerciser x20 reps 2 way  Exercise 11: Resisted exerciser for supination. 10 reps  Exercise 12: Tricep extension on Apollo machine 20# 2x10  (focus on eccentric return)  Exercise 13: Lat pull down on Apollo machine 20# 2x10  Exercise 14: Rows on Apollo Machine 10# 2x10  Exercise 15: bicep stretch at tower 30''x3  Exercise 16: eccentric bicep curl w/ 5# x10 (RUE helps w/ concentric movement to avoid quick fatigue)    Strength: [] NT  [] MMT completed:    ROM: [x] NT  [] ROM measurements:     Modalities:  Modalities  Moist heat: Moist heat x10 min to L elbow for post exertion pain control and mm relaxation     *Indicates exercise, continue current HEP. See objective section for any therapeutic exercise changes, additions or modifications this date.     PT Individual Minutes  Time In: 2378  Time Out: 4230  Minutes: 49  Timed Code Treatment Minutes: 39 Minutes  Procedure Minutes: 10 MHP     Modality Time In Time Out Total Minutes Units    Ther ex (30736) [phone] 39 3   Moist Hot pack [phone] 0     Signature:  Electronically signed by Jeanie Leyva PT on 4/13/21 at 2:25 PM EDT, Electronically signed by Dallas Clark PTA on 4/13/2021 at 2:38 PM

## 2021-04-15 ENCOUNTER — HOSPITAL ENCOUNTER (OUTPATIENT)
Dept: PHYSICAL THERAPY | Age: 51
Setting detail: THERAPIES SERIES
Discharge: HOME | End: 2021-04-15
Payer: COMMERCIAL

## 2021-04-15 PROCEDURE — 97110 THERAPEUTIC EXERCISES: CPT | Performed by: PHYSICAL THERAPIST

## 2021-04-15 ASSESSMENT — PAIN DESCRIPTION - PAIN TYPE: TYPE: SURGICAL PAIN

## 2021-04-15 ASSESSMENT — PAIN DESCRIPTION - FREQUENCY: FREQUENCY: INTERMITTENT

## 2021-04-15 ASSESSMENT — PAIN DESCRIPTION - PROGRESSION: CLINICAL_PROGRESSION: GRADUALLY IMPROVING

## 2021-04-15 ASSESSMENT — PAIN SCALES - GENERAL: PAINLEVEL_OUTOF10: 6

## 2021-04-15 ASSESSMENT — PAIN DESCRIPTION - LOCATION: LOCATION: ELBOW

## 2021-04-15 NOTE — PROGRESS NOTES
[address] [facility]  Outpatient Physical Therapy    Treatment Note        Date: 4/15/2021  Patient: Rut Goodrich  : 1970  ACCT #: [de-identified]  Referring Practitioner: Kira Montelongo M.D. Diagnosis: Biceps rupture, distal, left, subsequent encounter    Visit Information:  PT Visit Information  Onset Date: 21  PT Insurance Information: [facility]- claim #[id number]  Total # of Visits Approved: 24  Plan of Care/Certification Expiration Date: 21  Progress Note Counter:     Subjective: Pain at rest is 2/10, 6/10 with supination     HEP Compliance:  [x] Good [] Fair [] Poor [] Reports not doing due to:    Vital Signs  Patient Currently in Pain: Yes   Pain Screening  Patient Currently in Pain: Yes  Pain Assessment  Pain Assessment: 0-10  Pain Level: 6  Patient's Stated Pain Goal: No pain  Pain Type: Surgical pain  Pain Location: Elbow  Pain Orientation: Left  Pain Descriptors: Aching  Pain Frequency: Intermittent  Pain Onset: On-going  Clinical Progression: Gradually improving  Functional Pain Assessment: Prevents or interferes with many active not passive activities    OBJECTIVE:   Exercises  Exercise 1: eccentric weighted curls with 3# (using R arm to perform flexion- 5-7\" slow lowering into extension) x10  Exercise 2: AROM supination 5\"x10 with elbow flexed and extended  Exercise 4: Sitting elbow flexion 3 way (thumb up, palm up, plam down) 3# x15 ea. Exercise 7: UBE level 2.5    2.5 min.for.5' minutes forward and 2.5' minutes backwards. Exercise 8: black clips for arm flexion x3 reps  Exercise 9: Wire wrist exerciser x15 reps- performed with elbow extended vs flexed  Exercise 10: resisted wrist exerciser x20 reps 2 way  Exercise 11: Resisted exerciser for supination. 10 reps  Exercise 12: Tricep extension on Apollo machine 20# 3x10  (focus on eccentric return)  Exercise 13: Lat pull down on Apollo machine 20# 2x10  Exercise 14: Rows on Apollo Machine 10# 2x10  Exercise 15: bicep stretch at tower 30''x3  Exercise 16: eccentric bicep curl w/ 5# x10 (RUE helps w/ concentric movement to avoid quick fatigue)             Modalities:  Modalities  Moist heat: Moist heat x10 min to L elbow for post exertion pain control and mm relaxation  Cryotherapy (Minutes\Location): Ice pack to the elbow after exercise     *Indicates exercise, modality, or manual techniques to be initiated when appropriate    Assessment:   Activity Tolerance  Activity Tolerance: Patient Tolerated treatment well  Activity Tolerance: Tolerated further supination exercises, and increase in bicep strengthening. Assessment: Patient's  strength has not changed. She is better able to complete the exercises. Treatment Diagnosis: S/P left distal bicep repair  Prognosis: Excellent  Patient Education: VC's to decrease mm guarding and guarded position, to make a concious effort to straighten elbow multiple times throughout the day and with ADL's    Goals:  Short term goals  Time Frame for Short term goals: 3-7  Short term goal 1: Increase range of motion of the left shoulder to full motion all planes (goal met)  Short term goal 2: Increase left elbow supination 0-90 degrees  Short term goal 3: Pain decreased with activity to 5/10  Short term goal 4: Increase  strength by 10 to 20 lbs per each measurement on the left       Progress toward goals:    POST-PAIN       Pain Rating (0-10 pain scale):  2 /10   Location and pain description same as pre-treatment unless indicated. Action: [] NA   [x] Perform HEP  [] Meds as prescribed  [] Modalities as prescribed   [] Call Physician     Frequency/Duration:  Plan  Times per week: 2  Plan weeks: 6-8  Current Treatment Recommendations: Strengthening, ROM, Pain Management, Modalities, Manual Therapy - Soft Tissue Mobilization     Pt to continue current HEP. See objective section for any therapeutic exercise changes, additions or modifications this date.          PT Individual

## 2021-04-20 ENCOUNTER — HOSPITAL ENCOUNTER (OUTPATIENT)
Dept: PHYSICAL THERAPY | Age: 51
Setting detail: THERAPIES SERIES
Discharge: HOME | End: 2021-04-20
Payer: COMMERCIAL

## 2021-04-20 PROCEDURE — 97110 THERAPEUTIC EXERCISES: CPT

## 2021-04-20 NOTE — PROGRESS NOTES
[address] [facility]  Outpatient Physical Therapy    Treatment Note        Date: 2021  Patient: Ziggy Sherman  : 1970  ACCT #: [de-identified]  Referring Practitioner: Liz Diaz M.D. Diagnosis: Biceps rupture, distal, left, subsequent encounter    Visit Information:  PT Visit Information  Onset Date: 21  PT Insurance Information: [facility]- claim #[id number]  Total # of Visits Approved: 24  Total # of Visits to Date: 9  Plan of Care/Certification Expiration Date: 21  No Show: 0  Canceled Appointment: 0  Progress Note Counter:     Subjective: Pt reports no pain at rest this date though conts to have pain with supination and with perfoming a bicep curl reaching 6/10. Attempted bicep curls at the gym yesterday with weighted bar with some pain. Had a sharp pain that reached 8/10 after a quick reaction and trying to catch something she dropped. Comments: RTD 21  HEP Compliance:  [x] Good [] Fair [] Poor [] Reports not doing due to:    Vital Signs  Patient Currently in Pain: (see above)   Pain Screening  Patient Currently in Pain: (see above)    OBJECTIVE:   Exercises  Exercise 2: AROM supination 5\"x10 with elbow flexed; PROM supination with elbow extended 5\"x10  Exercise 4: Standing elbow flexion 2 way (thumb up, plam down) 3# x15 ea. Exercise 7: UBE level 2.5 - 2.5 min forward and 2.5' minutes backwards.   Exercise 8: black clips for arm flexion x3 reps  Exercise 10: resisted wrist exerciser x20 reps 2 way  Exercise 12: Tricep extension on Apollo machine 20# 2x15  (focus on eccentric return)  Exercise 13: Lat pull down on Apollo machine 20# 2x15  Exercise 14: Rows on Apollo Machine 10# 2x10  Exercise 15: bicep stretch at tower 30''x3  Exercise 16: eccentric bicep curl w/ 5# x15 x7 (RUE helps w/ concentric movement to avoid quick fatigue)- pt misunderstood therapist with reps of ex this date  Exercise 17: bent over tow with focus on eccentric lowering 5# 2x15  Exercise 18: bent over reverse fly with 3# weight x10    Strength: [x] NT  [] MMT completed:    ROM: [x] NT  [] ROM measurements:    Modalities:  Modalities  Moist heat: Moist heat x10 min to L elbow for post exertion pain control and mm relaxation     *Indicates exercise, modality, or manual techniques to be initiated when appropriate    Assessment: Body structures, Functions, Activity limitations: Decreased ROM, Decreased strength, Increased pain  Assessment: Contd to progress ther ex with addition of bemnt over rows and reverse flys to improve UE strength with tendon lengthening and constant tension throughout movment. Pt with good tolerance to tx though conts to have most pain with full elbow extension and supination. Treatment Diagnosis: S/P left distal bicep repair  Prognosis: Excellent  Patient Education: Importance of focusing on eccentric vs concentric bicep exercises for maximum tendon strengthening and avoidance of shortened tendon position    Goals:  Short term goals  Time Frame for Short term goals: 3-7  Short term goal 1: Increase range of motion of the left shoulder to full motion all planes (goal met)  Short term goal 2: Increase left elbow supination 0-90 degrees  Short term goal 3: Pain decreased with activity to 5/10  Short term goal 4: Increase  strength by 10 to 20 lbs per each measurement on the left    Long term goals  Time Frame for Long term goals : 8-24  Long term goal 1: Left elbow full range of motion  Long term goal 2: Strength of the left shoulder 5/5  Long term goal 3: Strength of left elbow 5/5  Long term goal 4: No further pain with use of the left elbow  Long term goal 5: Patient able to go back to full duty work  Progress toward goals: good towards strength with progressions     POST-PAIN       Pain Rating (0-10 pain scale):  0 /10   Location and pain description same as pre-treatment unless indicated.    Action: [] NA   [x] Perform HEP  [] Meds as prescribed  [] Modalities as prescribed   [] Call Physician     Frequency/Duration:  Plan  Times per week: 2  Plan weeks: 6-8  Current Treatment Recommendations: Strengthening, ROM, Pain Management, Modalities, Manual Therapy - Soft Tissue Mobilization     Pt to continue current HEP. See objective section for any therapeutic exercise changes, additions or modifications this date.     PT Individual Minutes  Time In: 6649  Time Out: 2517  Minutes: 51  Timed Code Treatment Minutes: 41 Minutes  Procedure Minutes: 10 MHP     Modality Time In Time Out Total Minutes Units    Ther ex ([id number]) [id number]   Moist Heat  (41660) [id number] 0     Signature:  Electronically signed by Katerin Stacy PT on 4/20/21 at 11:28 AM EDT

## 2021-04-23 ENCOUNTER — HOSPITAL ENCOUNTER (OUTPATIENT)
Dept: PHYSICAL THERAPY | Age: 51
Setting detail: THERAPIES SERIES
Discharge: HOME | End: 2021-04-23
Payer: COMMERCIAL

## 2021-04-23 PROCEDURE — 97110 THERAPEUTIC EXERCISES: CPT

## 2021-04-23 NOTE — PROGRESS NOTES
[address] [facility]  Outpatient Physical Therapy    Treatment Note        Date: 2021  Patient: Rut Goodrich  : 1970  ACCT #: [de-identified]  Referring Practitioner: Kira Montelongo M.D. Diagnosis: Biceps rupture, distal, left, subsequent encounter    Visit Information:  PT Visit Information  Onset Date: 21  PT Insurance Information: [facility]- claim #[id number]  Total # of Visits Approved: 24  Total # of Visits to Date: 10  Plan of Care/Certification Expiration Date: 21  No Show: 0  Canceled Appointment: 0  Progress Note Counter: 10/24    Subjective: Pt reports soreness after LV. Pt states she did some of her HEP yesterday and was also sore from that. Pt cont's to deny pain at rest, though cont'd pain w/ elbow ext and supination. Comments: RTD 21  HEP Compliance:  [x] Good [] Fair [] Poor [] Reports not doing due to:    Vital Signs  Patient Currently in Pain: Denies(see above)   Pain Screening  Patient Currently in Pain: Denies(see above)    OBJECTIVE:   Exercises  Exercise 2: AROM supination 5\"x10 with elbow flexed; PROM supination with elbow extended 5\"x10  Exercise 4: Standing elbow flexion 2 way (thumb up, plam down) 3# x15 ea. Exercise 7: UBE level 2.8 - 2.5 min forward and 2.5' minutes backwards.   Exercise 8: black clips for arm flexion x3 reps  Exercise 12: Tricep extension on Apollo machine 20# 2x15  (focus on eccentric return)  Exercise 13: Lat pull down on Apollo machine 20# 2x15  Exercise 14: Rows on Apollo Machine 20# 2x15  Exercise 15: bicep stretch at tower 30''x3  Exercise 16: eccentric bicep curl w/ 5# x15(RUE helps w/ concentric movement to avoid quick fatigue)  Exercise 17: bent over tow with focus on eccentric lowering 5# 2x15  Exercise 18: bent over reverse fly with 3# weight x10    Strength: [x] NT  [] MMT completed:    ROM: [x] NT  [] ROM measurements:     Modalities:  Modalities  Moist heat: Moist heat x10 min to L elbow for post exertion pain control and mm relaxation     *Indicates exercise, modality, or manual techniques to be initiated when appropriate    Assessment: Body structures, Functions, Activity limitations: Decreased ROM, Decreased strength, Increased pain  Assessment: Limited progressions d/t pt reporting inc soreness after LV and cont'd soreness from performing HEP yesterday. Pt reports most difficulty w/ eccentric bicep curl and supination w/ elbow flexed<extended. Treatment Diagnosis: S/P left distal bicep repair  Prognosis: Excellent     Goals:  Short term goals  Time Frame for Short term goals: 3-7  Short term goal 1: Increase range of motion of the left shoulder to full motion all planes (goal met)  Short term goal 2: Increase left elbow supination 0-90 degrees  Short term goal 3: Pain decreased with activity to 5/10  Short term goal 4: Increase  strength by 10 to 20 lbs per each measurement on the left  Long term goals  Time Frame for Long term goals : 8-24  Long term goal 1: Left elbow full range of motion  Long term goal 2: Strength of the left shoulder 5/5  Long term goal 3: Strength of left elbow 5/5  Long term goal 4: No further pain with use of the left elbow  Long term goal 5: Patient able to go back to full duty work  Progress toward goals: increase strength, ROM, decrease pain    POST-PAIN       Pain Rating (0-10 pain scale):  \"sore\" /10   Location and pain description same as pre-treatment unless indicated. Action: [] NA   [x] Perform HEP  [] Meds as prescribed  [] Modalities as prescribed   [] Call Physician     Frequency/Duration:  Plan  Times per week: 2  Plan weeks: 6-8  Current Treatment Recommendations: Strengthening, ROM, Pain Management, Modalities, Manual Therapy - Soft Tissue Mobilization     Pt to continue current HEP. See objective section for any therapeutic exercise changes, additions or modifications this date.     PT Individual Minutes  Time In: 0848  Time Out: 4178  Minutes: 50  Timed Code Treatment Minutes: 40 Minutes  Procedure Minutes: 10 MHP     Modality Time In Time Out Total Minutes Units    Ther ex ([id number]) [phone]     Signature:  Electronically signed by Sophy Cadena PTA on 4/23/21 at 8:53 AM EDT

## 2021-04-27 ENCOUNTER — HOSPITAL ENCOUNTER (OUTPATIENT)
Dept: PHYSICAL THERAPY | Age: 51
Setting detail: THERAPIES SERIES
Discharge: HOME | End: 2021-04-27
Payer: COMMERCIAL

## 2021-04-27 PROCEDURE — 97110 THERAPEUTIC EXERCISES: CPT
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
## 2021-04-30 ENCOUNTER — HOSPITAL ENCOUNTER (OUTPATIENT)
Dept: PHYSICAL THERAPY | Age: 51
Setting detail: THERAPIES SERIES
Discharge: HOME | End: 2021-04-30
Payer: COMMERCIAL

## 2021-04-30 PROCEDURE — 97110 THERAPEUTIC EXERCISES: CPT

## 2021-04-30 PROCEDURE — 97140 MANUAL THERAPY 1/> REGIONS: CPT

## 2021-04-30 NOTE — PROGRESS NOTES
[address] [facility]  Outpatient Physical Therapy    Treatment Note        Date: 2021  Patient: Iliana Sr  : 1970  ACCT #: [de-identified]  Referring Practitioner: Edita Skelton M.D. Diagnosis: Biceps rupture, distal, left, subsequent encounter    Visit Information:  PT Visit Information  Onset Date: 21  PT Insurance Information: [facility]- claim #[id number]  Total # of Visits Approved: 24  Total # of Visits to Date: 12  Plan of Care/Certification Expiration Date: 21  No Show: 0  Canceled Appointment: 0  Progress Note Counter:     Subjective: Pt reports she's sore from doing bicep curls yesterday. Comments: RTD 21  HEP Compliance:  [x] Good [] Fair [] Poor [] Reports not doing due to:    Vital Signs  Patient Currently in Pain: Denies   Pain Screening  Patient Currently in Pain: Denies    OBJECTIVE:   Exercises  Exercise 3: overhead tricep extension 3# 2x15  Exercise 4: Standing elbow flexion 2 way (thumb up, plam down) 3# x15 ea. Exercise 7: UBE level 3.0- 2.5 min forward and 2.5' minutes backwards. Exercise 8: black clips for arm flexion x5 reps  Exercise 12: Tricep extension on Apollo machine 20# 2x15  (focus on eccentric return)  Exercise 13: Lat pull down on Apollo machine 20# 2x18  Exercise 14: Rows on Apollo Machine 20# 2x18  Exercise 16: eccentric bicep curl w/ 5# x15(RUE helps w/ concentric movement to avoid quick fatigue)  Exercise 17: bent over row with focus on eccentric lowering 5# 2x15  Exercise 18: bent over reverse fly with 3# weight x15  Exercise 19: shoulder flex/abd to 90 deg 2# x10    Strength: [x] NT  [] MMT completed:    ROM: [x] NT  [] ROM measurements:     Modalities:  Modalities  Moist heat: Moist heat x10 min to L elbow for post exertion pain control and mm relaxation     *Indicates exercise, modality, or manual techniques to be initiated when appropriate    Assessment:    Body structures, Functions, Activity limitations: Decreased ROM, 10     Modality Time In Time Out Total Minutes Units    Ther ex ([id number]) [phone]   MHP (31490) [phone] 1     Signature:  Electronically signed by Maria Del Carmen Flores PTA on 4/30/21 at 9:44 AM EDT

## 2021-05-04 ENCOUNTER — HOSPITAL ENCOUNTER (OUTPATIENT)
Dept: PHYSICAL THERAPY | Age: 51
Setting detail: THERAPIES SERIES
Discharge: HOME | End: 2021-05-04
Payer: COMMERCIAL

## 2021-05-04 PROCEDURE — 97110 THERAPEUTIC EXERCISES: CPT

## 2021-05-04 NOTE — PROGRESS NOTES
[address] [facility]  Outpatient Physical Therapy    Treatment Note        Date: 2021  Patient: Lisa Juan  : 1970  ACCT #: [de-identified]  Referring Practitioner: Umu Colindres M.D. Diagnosis: Biceps rupture, distal, left, subsequent encounter    Visit Information:  PT Visit Information  Onset Date: 21  PT Insurance Information: [facility]- claim #[id number]  Total # of Visits Approved: 24  Total # of Visits to Date: 13  Plan of Care/Certification Expiration Date: 21  No Show: 0  Canceled Appointment: 0  Progress Note Counter:     Subjective: Pt continues to deny pain at rest, only with supintaion and bicep curl reaching 6/10  Comments: RTD 21  HEP Compliance:  [x] Good [] Fair [] Poor [] Reports not doing due to:    Vital Signs  Patient Currently in Pain: Denies   Pain Screening  Patient Currently in Pain: Denies    OBJECTIVE:   Exercises  Exercise 3: overhead tricep extension 4# 2x10  Exercise 4: Standing elbow flexion 2 way (thumb up, plam down) 4# x10 ea.   Exercise 8: black clips for arm flexion x5 reps  Exercise 12: Tricep extension on Apollo machine 20# 2x18  (focus on eccentric return)  Exercise 13: Lat pull down on Apollo machine 20# 2x18  Exercise 14: Rows on Apollo Machine 20# 2x18  Exercise 18: bent over reverse fly with 3# weight x15  Exercise 19: shoulder flex/abd to 90 deg 3# x10    Strength: [] NT  [x] MMT completed:  Strength LUE  L Shoulder Flexion: 4/5  L Shoulder Extension: 4+/5  L Shoulder ABduction: 4/5  L Shoulder Internal Rotation: 4/5  L Shoulder External Rotation: 4/5  L Elbow Flexion: 4-/5  L Elbow Extension: 4/5  L Forearm Pron: 4/5  L Forearm Sup: 4-/5  Strength Other  Other:  strength elbow flexed 22#, elbow ext 26#    ROM: [] NT  [x] ROM measurements:  AROM LUE (degrees)  L Shoulder Flexion 0-180: 0-180  L Shoulder Extension 0-45: 0-45  L Shoulder ABduction 0-180: 0-180  L Shoulder Int Rotation  0-70: 0-70  L Shoulder Ext Rotation 0-90: 0-90  L Elbow Flexion 0-145: 0-148  L Elbow Extension 145-0: 0  L Forearm Supination  0-90: 0-80     Modalities:  Modalities  Moist heat: Moist heat x10 min to L elbow for post exertion pain control and mm relaxation     *Indicates exercise, modality, or manual techniques to be initiated when appropriate    Assessment: Body structures, Functions, Activity limitations: Decreased ROM, Decreased strength, Increased pain  Assessment: Pt continues to complain of pain with supintaion and elbow flex, primarily when supinated. Pt continues to report soreness lasting multiple days after performing bicep curls. Pt demo's WNL shoulder and elbow ROM, but cont's to lack strength at both elbow and shoulder. Pt would benefit from cont'd PT to increase strength of left shoulder and elbow. Treatment Diagnosis: S/P left distal bicep repair  Prognosis: Excellent     Goals:  Short term goals  Time Frame for Short term goals: 3-7  Short term goal 1: Increase range of motion of the left shoulder to full motion all planes (goal met)  Short term goal 2: Increase left elbow supination 0-90 degrees  Short term goal 3: Pain decreased with activity to 5/10  Short term goal 4: Increase  strength by 10 to 20 lbs per each measurement on the left  Long term goals  Time Frame for Long term goals : 8-24  Long term goal 1: Left elbow full range of motion  Long term goal 2: Strength of the left shoulder 5/5  Long term goal 3: Strength of left elbow 5/5  Long term goal 4: No further pain with use of the left elbow  Long term goal 5: Patient able to go back to full duty work  Progress toward goals: increased strength, ROM    POST-PAIN       Pain Rating (0-10 pain scale):   0/10   Location and pain description same as pre-treatment unless indicated.    Action: [] NA   [x] Perform HEP  [] Meds as prescribed  [] Modalities as prescribed   [] Call Physician     Frequency/Duration:  Plan  Times per week: 2  Plan weeks: 6-8  Current Treatment

## 2021-05-07 ENCOUNTER — HOSPITAL ENCOUNTER (OUTPATIENT)
Dept: PHYSICAL THERAPY | Age: 51
Setting detail: THERAPIES SERIES
End: 2021-05-07
Payer: COMMERCIAL

## 2021-05-11 ENCOUNTER — HOSPITAL ENCOUNTER (OUTPATIENT)
Dept: PHYSICAL THERAPY | Age: 51
Setting detail: THERAPIES SERIES
Discharge: HOME | End: 2021-05-11
Payer: COMMERCIAL

## 2021-05-11 PROCEDURE — 97110 THERAPEUTIC EXERCISES: CPT

## 2021-05-11 NOTE — PROGRESS NOTES
[address] [facility]  Outpatient Physical Therapy    Treatment Note        Date: 2021  Patient: Santosh Tenorio  : 1970  ACCT #: [de-identified]  Referring Practitioner: Judy Martinez M.D. Diagnosis: Biceps rupture, distal, left, subsequent encounter    Visit Information:  PT Visit Information  Onset Date: 21  PT Insurance Information: [facility]- claim #[id number]  Total # of Visits Approved: 24  Total # of Visits to Date: 14  No Show: 0  Canceled Appointment: 0  Progress Note Counter:     Subjective: Pt with no new reports stating \"it's about the same. \"  Comments: RTD 21  HEP Compliance:  [x] Good [] Fair [] Poor [] Reports not doing due to:    Vital Signs  Patient Currently in Pain: Denies   Pain Screening  Patient Currently in Pain: Denies    OBJECTIVE:   Exercises  Exercise 4: Standing elbow flexion 2 way (thumb up, plam down) 4# x10 ea. Exercise 7: UBE level 3.5- 2.5 min forward and 2.5' minutes backwards. Exercise 8: black clips for arm flexion x5 reps  Exercise 9: weighted hammer supination, pronation 2.5# elbow flexed x10, no added weight elbow ext x10  Exercise 10: body blade elbow flex 20''x2, elbow ext 20''x2  Exercise 12: Tricep extension on Apollo machine 30# 2x10  (focus on eccentric return)  Exercise 13: Lat pull down on Apollo machine 30# 2x10  Exercise 14: Rows on Apollo Machine 30# 2x10  Exercise 18: bent over reverse fly with 3# weight x15  Exercise 19: shoulder flex/abd to 90 deg 3# 2x15    Strength: [x] NT  [] MMT completed:    ROM: [x] NT  [] ROM measurements:     Modalities:  Modalities  Moist heat: Moist heat x10 min to L elbow for post exertion pain control and mm relaxation     *Indicates exercise, modality, or manual techniques to be initiated when appropriate    Assessment:    Body structures, Functions, Activity limitations: Decreased ROM, Decreased strength, Increased pain, Decreased functional mobility   Assessment: progressed weight or reps with select exercises for increased strength. Also initiated supination/pronation with weighted hammer with elbow flexed, however pt unable to tolerate elbow extended with weight d/t pain. Also initiated body blade with elbow flex/ext with good tolerance, though decreased coordination on the left. Treatment Diagnosis: S/P left distal bicep repair  Prognosis: Excellent     Goals:  Short term goals  Time Frame for Short term goals: 3-7  Short term goal 1: Increase range of motion of the left shoulder to full motion all planes (goal met)  Short term goal 2: Increase left elbow supination 0-90 degrees  Short term goal 3: Pain decreased with activity to 5/10  Short term goal 4: Increase  strength by 10 to 20 lbs per each measurement on the left  Long term goals  Time Frame for Long term goals : 8-24  Long term goal 1: Left elbow full range of motion  Long term goal 2: Strength of the left shoulder 5/5  Long term goal 3: Strength of left elbow 5/5  Long term goal 4: No further pain with use of the left elbow  Long term goal 5: Patient able to go back to full duty work  Progress toward goals: increase strength    POST-PAIN       Pain Rating (0-10 pain scale):   0/10   Location and pain description same as pre-treatment unless indicated. Action: [] NA   [x] Perform HEP  [] Meds as prescribed  [] Modalities as prescribed   [] Call Physician     Frequency/Duration:  Plan  Times per week: 2  Plan weeks: 6-8  Current Treatment Recommendations: Strengthening, ROM, Pain Management, Modalities, Manual Therapy - Soft Tissue Mobilization     Pt to continue current HEP. See objective section for any therapeutic exercise changes, additions or modifications this date.     PT Individual Minutes  Time In: 2881  Time Out: 4093  Minutes: 48  Timed Code Treatment Minutes: 38 Minutes  Procedure Minutes: 10     Modality Time In Time Out Total Minutes Units    Ther ex (72662) [phone] 65 4   MHP (35144) [phone] 10 0     Signature: Electronically signed by Sima Leach PTA on 5/11/21 at 8:51 AM EDT

## 2021-05-14 ENCOUNTER — HOSPITAL ENCOUNTER (OUTPATIENT)
Dept: PHYSICAL THERAPY | Age: 51
Setting detail: THERAPIES SERIES
Discharge: HOME | End: 2021-05-14
Payer: COMMERCIAL

## 2021-05-14 PROCEDURE — 97110 THERAPEUTIC EXERCISES: CPT

## 2021-05-14 NOTE — PROGRESS NOTES
[address] [facility]  Outpatient Physical Therapy    Treatment Note        Date: 2021  Patient: Daniela Covington  : 1970  ACCT #: [de-identified]  Referring Practitioner: Dionna Barragan M.D. Diagnosis: Biceps rupture, distal, left, subsequent encounter    Visit Information:  PT Visit Information  Onset Date: 21  PT Insurance Information: [facility]- claim #[id number]  Total # of Visits Approved: 24  Total # of Visits to Date: 15  No Show: 0  Canceled Appointment: 0  Progress Note Counter: 15/24    Subjective: Pt reports she was sore after LV. Comments: RTD 21  HEP Compliance:  [x] Good [] Fair [] Poor [] Reports not doing due to:    Vital Signs  Patient Currently in Pain: Denies   Pain Screening  Patient Currently in Pain: Denies    OBJECTIVE:   Exercises  Exercise 4: Standing elbow flexion 2 way (thumb up, plam down) 4# x12 ea. Exercise 7: UBE level 3.5- 2.5 min forward and 2.5' minutes backwards. Exercise 8: black clips for arm flexion x5 reps  Exercise 9: weighted hammer supination, pronation 2.5# elbow flexed x10, no added weight elbow ext x10  Exercise 10: body blade elbow flex 30''x2, elbow ext 20''x2  Exercise 12: Tricep extension on Apollo machine 30# 2x10  (focus on eccentric return)  Exercise 13: Lat pull down on Apollo machine 30# 2x10  Exercise 14: Rows on Apollo Machine 30# 2x10  Exercise 18: bent over reverse fly with 3# weight x15  Exercise 19: shoulder flex/abd to 90 deg 3# 2x15    Strength: [x] NT  [] MMT completed:      ROM: [x] NT  [] ROM measurements:     Modalities:  Modalities  Moist heat: Moist heat x10 min to L elbow for post exertion pain control and mm relaxation     *Indicates exercise, modality, or manual techniques to be initiated when appropriate    Assessment:    Body structures, Functions, Activity limitations: Decreased ROM, Decreased strength, Increased pain, Decreased functional mobility   Assessment: Cont with tx per POC for increased strength of L elbow and shoulder. Pt cont to report the most difficulty with supination hammer and fatigue with body blade. Pt taking a few short rest breaks to stretch elbow/forearm throughout. Treatment Diagnosis: S/P left distal bicep repair  Prognosis: Excellent     Goals:  Short term goals  Time Frame for Short term goals: 3-7  Short term goal 1: Increase range of motion of the left shoulder to full motion all planes (goal met)  Short term goal 2: Increase left elbow supination 0-90 degrees  Short term goal 3: Pain decreased with activity to 5/10  Short term goal 4: Increase  strength by 10 to 20 lbs per each measurement on the left  Long term goals  Time Frame for Long term goals : 8-24  Long term goal 1: Left elbow full range of motion  Long term goal 2: Strength of the left shoulder 5/5  Long term goal 3: Strength of left elbow 5/5  Long term goal 4: No further pain with use of the left elbow  Long term goal 5: Patient able to go back to full duty work  Progress toward goals: increase strength    POST-PAIN       Pain Rating (0-10 pain scale):  0 /10   Location and pain description same as pre-treatment unless indicated. Action: [] NA   [x] Perform HEP  [] Meds as prescribed  [] Modalities as prescribed   [] Call Physician     Frequency/Duration:  Plan  Times per week: 2  Plan weeks: 6-8  Current Treatment Recommendations: Strengthening, ROM, Pain Management, Modalities, Manual Therapy - Soft Tissue Mobilization     Pt to continue current HEP. See objective section for any therapeutic exercise changes, additions or modifications this date.     PT Individual Minutes  Time In: 5205  Time Out: 1840  Minutes: 48  Timed Code Treatment Minutes: 38 Minutes  Procedure Minutes: 10     Modality Time In Time Out Total Minutes Units    Ther ex (53431) [phone] 5   MHP (80980) [phone] 0     Signature:  Electronically signed by Mateus Mcleod PTA on 5/14/21 at 8:46 AM EDT

## 2021-05-17 ENCOUNTER — OFFICE VISIT (OUTPATIENT)
Dept: ORTHOPEDIC SURGERY | Age: 51
End: 2021-05-17
Payer: COMMERCIAL

## 2021-05-17 VITALS
HEIGHT: 67 IN | TEMPERATURE: 96.8 F | WEIGHT: 149 LBS | HEART RATE: 80 BPM | BODY MASS INDEX: 23.39 KG/M2 | OXYGEN SATURATION: 98 %

## 2021-05-17 DIAGNOSIS — S46.212D RUPTURE OF LEFT DISTAL BICEPS TENDON, SUBSEQUENT ENCOUNTER: Primary | ICD-10-CM

## 2021-05-17 PROCEDURE — 99213 OFFICE O/P EST LOW 20 MIN: CPT | Performed by: ORTHOPAEDIC SURGERY

## 2021-05-17 NOTE — PROGRESS NOTES
well-healed incisions. There is slight tenderness over the biceps tendon, as well as there is tenderness over the brachialis muscle. There is no tenderness in the shoulder. She can actively abduct the shoulder 180 degrees. With external rotation against resistance there is slight soreness in the proximal humerus. The left elbow has full extension full flexion, and full supination full pronation actively as well as passively. Radiographs and Laboratory Studies:     Diagnostic Imaging Studies:    None      Procedures Performed Today:     None    Assessment / Plan:      Diagnosis Orders   1. Rupture of left distal biceps tendon, subsequent encounter        No orders of the defined types were placed in this encounter. No orders of the defined types were placed in this encounter. Patient Instructions   We discussed that she seems inflamed, and recommend to build in a rest period for the next 4 weeks. She may take an anti-inflammatory, or ice this. We discussed that she has full range of motion supination pronation, and the fact that she has difficulty keeping her wrist straight up and full extension of her arm is a shoulder issue, and not a elbow issue. She does not seem to have any shoulder problems, and is mostly tender over the brachialis muscle. With this you back in 4 weeks for clinical evaluation. Return in about 4 weeks (around 6/14/2021) for For a clinical evaluation.     Elvia White M.D., [facility]  Orthopaedic Surgery

## 2021-05-17 NOTE — PATIENT INSTRUCTIONS
We discussed that she seems inflamed, and recommend to build in a rest period for the next 4 weeks. She may take an anti-inflammatory, or ice this. We discussed that she has full range of motion supination pronation, and the fact that she has difficulty keeping her wrist straight up and full extension of her arm is a shoulder issue, and not a elbow issue. She does not seem to have any shoulder problems, and is mostly tender over the brachialis muscle. With this you back in 4 weeks for clinical evaluation.

## 2021-05-18 ENCOUNTER — HOSPITAL ENCOUNTER (OUTPATIENT)
Dept: PHYSICAL THERAPY | Age: 51
Setting detail: THERAPIES SERIES
Discharge: HOME | End: 2021-05-18
Payer: COMMERCIAL

## 2021-05-18 NOTE — PROGRESS NOTES
Therapy                            Cancellation/No-show Note      Date:  2021  Patient Name:  Dick Gonzalez  :  1970   MRN:  [id number]  Referring Practitioner: Iliana Hanley M.D. Diagnosis: Biceps rupture, distal, left, subsequent encounter    Visit Information:  PT Visit Information  Onset Date: 21  PT Insurance Information: [facility]- claim #[id number]  Total # of Visits Approved: 24  Total # of Visits to Date: 15  No Show: 0  Canceled Appointment: 1  Progress Note Counter: 15/24 CX 21 and D/C per MD    For today's appointment patient:  [x]  Cancelled  []  Rescheduled appointment  []  No-show   []  Called pt to remind of next appointment     Reason given by patient:  []  Patient ill  []  Conflicting appointment  []  No transportation    []  Conflict with work  []  No reason given  [x]  Other: pt called and stated her MD told her to hold therapy for at least 4 weeks d/t inflammation. Told pt she would be D/C'd and if needing to return to PT would need new order from MD at next follow up.         Signature: Electronically signed by Rufus Christopher PTA on 21 at 8:00 AM EDT

## 2021-05-21 ENCOUNTER — APPOINTMENT (OUTPATIENT)
Dept: PHYSICAL THERAPY | Age: 51
End: 2021-05-21
Payer: COMMERCIAL

## 2021-06-14 ENCOUNTER — OFFICE VISIT (OUTPATIENT)
Dept: ORTHOPEDIC SURGERY | Age: 51
End: 2021-06-14
Payer: COMMERCIAL

## 2021-06-14 VITALS
OXYGEN SATURATION: 98 % | RESPIRATION RATE: 18 BRPM | HEIGHT: 67 IN | TEMPERATURE: 97 F | WEIGHT: 149 LBS | HEART RATE: 57 BPM | BODY MASS INDEX: 23.39 KG/M2

## 2021-06-14 DIAGNOSIS — S46.212D RUPTURE OF LEFT DISTAL BICEPS TENDON, SUBSEQUENT ENCOUNTER: Primary | ICD-10-CM

## 2021-06-14 PROCEDURE — 99212 OFFICE O/P EST SF 10 MIN: CPT | Performed by: ORTHOPAEDIC SURGERY

## 2021-06-14 NOTE — PROGRESS NOTES
stress. Incision is healed well. She has full range of motion of the elbow with full supination pronation, with crepitus at the radial tuberosity. She is slightly tender over the brachialis muscle. She is neurovascular tact. Radiographs and Laboratory Studies:     Diagnostic Imaging Studies:    None      Procedures Performed Today:     None    Assessment / Plan:      Diagnosis Orders   1. Rupture of left distal biceps tendon, subsequent encounter        No orders of the defined types were placed in this encounter. No orders of the defined types were placed in this encounter. Patient Instructions   Recommend that she goes back to physical therapy, with general strengthening excises. If she still is sore in 6 weeks we will start work hardening. Return in about 6 weeks (around 7/26/2021) for For a clinical evaluation.     Edith Kim M.D., [facility]  Orthopaedic Surgery

## 2021-06-14 NOTE — PATIENT INSTRUCTIONS
Recommend that she goes back to physical therapy, with general strengthening excises. If she still is sore in 6 weeks we will start work hardening.

## 2021-06-23 ENCOUNTER — HOSPITAL ENCOUNTER (OUTPATIENT)
Dept: PHYSICAL THERAPY | Age: 51
Setting detail: THERAPIES SERIES
Discharge: HOME | End: 2021-06-23
Payer: COMMERCIAL

## 2021-06-23 PROCEDURE — 97161 PT EVAL LOW COMPLEX 20 MIN: CPT | Performed by: PHYSICAL THERAPIST

## 2021-06-23 PROCEDURE — 97110 THERAPEUTIC EXERCISES: CPT | Performed by: PHYSICAL THERAPIST

## 2021-06-23 ASSESSMENT — PAIN DESCRIPTION - FREQUENCY: FREQUENCY: INTERMITTENT

## 2021-06-23 ASSESSMENT — PAIN DESCRIPTION - PAIN TYPE: TYPE: SURGICAL PAIN

## 2021-06-23 ASSESSMENT — PAIN - FUNCTIONAL ASSESSMENT: PAIN_FUNCTIONAL_ASSESSMENT: PREVENTS OR INTERFERES SOME ACTIVE ACTIVITIES AND ADLS

## 2021-06-23 ASSESSMENT — PAIN DESCRIPTION - LOCATION: LOCATION: ELBOW

## 2021-06-23 ASSESSMENT — PAIN DESCRIPTION - ORIENTATION: ORIENTATION: LEFT

## 2021-06-23 ASSESSMENT — PAIN DESCRIPTION - DESCRIPTORS: DESCRIPTORS: ACHING

## 2021-06-23 ASSESSMENT — PAIN DESCRIPTION - ONSET: ONSET: ON-GOING

## 2021-06-23 ASSESSMENT — PAIN SCALES - GENERAL: PAINLEVEL_OUTOF10: 5

## 2021-06-23 ASSESSMENT — PAIN DESCRIPTION - PROGRESSION: CLINICAL_PROGRESSION: GRADUALLY IMPROVING

## 2021-06-23 NOTE — PROGRESS NOTES
[address] [facility]  PHYSICAL THERAPY EVALUATION    Date: 2021  Patient Name: Don Riley       MRN: [id number]   Account: [de-identified]   : 1970  (53 y.o.)   Gender: female   Referring Practitioner: Chrissy Loza M.D. Diagnosis: Biceps rupture, distal, left, subsequent encounter  Treatment Diagnosis: S/P left distal bicep repair     Restrictions/Precautions: Surgical Protocols     Spinal Precautions: No Lifting  Other position/activity restrictions: She is still off duty from work    Past Medical History:  has a past medical history of Asthma and Thyroid disease. Past Surgical History:   has a past surgical history that includes [place] tooth extraction; Wrist fusion (Bilateral); Back Injection; and Biceps tendon repair (Left, 1/15/2021). Vital Signs  Patient Currently in Pain: Yes   Pain Screening  Patient Currently in Pain: Yes  Pain Assessment  Pain Assessment: 0-10  Pain Level: 5 (with her left arm away from her body, her pain runs diagonally from the cubital fossa to lateral mid arm)  Patient's Stated Pain Goal: No pain  Pain Type: Surgical pain  Pain Location: Elbow  Pain Orientation: Left  Pain Radiating Towards: cubital fossa to lateral mid upper arm  Pain Descriptors: Aching  Pain Frequency: Intermittent  Pain Onset: On-going  Clinical Progression: Gradually improving  Functional Pain Assessment: Prevents or interferes some active activities and ADLs          Subjective:  Subjective: Patient feels that she has made progress as far as her surgery, however she feels that her brachialis is injured and she feels she needs an ultrasound to determine if it is injured.   Comments: New C-9 obtained with end date of 21    Objective:       Strength LUE  L Shoulder Flexion: 5/5  L Shoulder Extension: 5/5  L Shoulder ABduction: 5/5  L Shoulder Internal Rotation: 4+/5  L Shoulder External Rotation: 4+/5  L Elbow Flexion: 4+/5  L Elbow Extension: 4+/5  L Forearm Pron: 4+/5  L Forearm Sup: 4+/5     AROM RUE (degrees)  RUE AROM : WNL  AROM LUE (degrees)  L Shoulder Flexion 0-180: 0-180  L Shoulder Extension 0-45: 0-45  L Shoulder ABduction 0-180: 0-180  L Shoulder Int Rotation  0-70: 0-70  L Shoulder Ext Rotation  0-90: 0-90  L Elbow Flexion 0-145: 0-150  L Elbow Extension 145-0: 0  L Forearm Pron 0-90: 0-90  L Forearm Supination  0-90: 0-90  L Wrist Flexion 0-80: 0-80  L Wrist Extension 0-70: 0-70  L Wrist Radial Deviation 0-20: 0-20  L Wrist Ulnar Deviation 0-45: 0-45    Exercises:   Exercises  Exercise 1: eccentric weighted curls with 3# (using R arm to perform flexion- 5-7\" slow lowering into extension) x10  Exercise 3: overhead tricep extension 3# 2x10  Exercise 4: Standing elbow flexion 2 way (thumb up, plam down) 3# x12 ea. Exercise 13: Lat pull down on Apollo machine 20# 2x10  Exercise 14: Rows on Apollo Machine 30# 2x10  Exercise 17: bent over row with focus on eccentric lowering 5# 2x15  Exercise 18: bent over reverse fly with 3# weight x15  Exercise 19: shoulder flex/abd to 90 deg 2# 2x15  Exercise 20: catch light weight ball palm up and arm in nuetral with 1# ball  Modalities:  Modalities  Cryotherapy (Minutes\Location): Ice pack to the elbow after exercise       *Indicates exercise,modality, or manual techniques to be initiated when appropriate  Assessment: Body structures, Functions, Activity limitations: Decreased strength, Increased pain (Unable to tolerate regular work duty)  Assessment: Patient's range of motion of her elbow is within normal limits. She does need strengthening of the left elbow and shoulder.   Prognosis: Excellent  Discharge Recommendations: Continue to assess pending progress  Activity Tolerance: Patient Tolerated treatment well  Activity Tolerance: Patient requires a moderate amount of verbal cues to perform the     Decision Making: Medium Complexity  History: medium  Exam: low Plan  Frequency/Duration:  Plan  Times per week: 2  Plan weeks: 6-8  Current Treatment Recommendations: Strengthening, Pain Management (work simulated activities)         Patient Education  New Education Provided: PT Education: Home Exercise Program  Patient Education: Importance of focusing on eccentric vs concentric bicep exercises for maximum tendon strengthening and avoidance of shortened tendon position    POST-PAIN     Pain Rating (0-10 pain scale):   0/10  Location and pain description same as pre-treatment unless indicated. Action: [] NA  [] Call Physician  [] Perform HEP  [] Meds as prescribed    Evaluation and patient rights have been reviewed and patient agrees with plan of care. Yes  []  No  []   Explain:       [place] Fall Risk Assessment  Risk Factor Scale  Score   History of Falls [] Yes  [x] No 25  0 0   Secondary Diagnosis [] Yes  [x] No 15  0 0   Ambulatory Aid [] Furniture  [] Crutches/cane/walker  [x] None/bedrest/wheelchair/nurse 30  15  0 0   IV/Heparin Lock [] Yes  [x] No 20  0 0   Gait/Transferring [] Impaired  [] Weak  [x] Normal/bedrest/immobile 20  10  0 0   Mental Status [] Forgets limitations  [x] Oriented to own ability 15  0 0      Total:  0     Based on the Assessment score: check the appropriate box. [x]  No intervention needed   Low =   Score of 0-24  []  Use standard prevention interventions Moderate =  Score of 24-44   [] Discuss fall prevention strategies   [] Indicate moderate falls risk on eval  []  Use high risk prevention interventions High = Score of 45 and higher   [] Discuss fall prevention strategies   [] Provide supervision during treatment time    Goals  Short term goals  Time Frame for Short term goals: 3-7  Short term goal 1: Increase range of motion of the left shoulder to full motion all planes (goal met)  Short term goal 2:  Increase left elbow supination 0-90 degrees (goal met )  Short term goal 3: Pain decreased with activity to 5/10  Short term goal 4: Increase  strength by 10 to 20 lbs per each measurement on the left  Long term goals  Time Frame for Long term goals : 8-16  Long term goal 1: Left elbow full range of motion (goal met )  Long term goal 2: Strength of the left shoulder 5/5 (goal met )  Long term goal 3: Strength of left elbow 5/5  Long term goal 4: No further pain with use of the left elbow  Long term goal 5: Patient able to go back to full duty work         PT Individual Minutes  Time In: 0900  Time Out: 0945  Minutes: 45  Timed Code Treatment Minutes: 45 Minutes  Procedure Minutes:     Modality Time In Time Out Total Time Units    PT Evaluation: Low Complexity (81865) 0900 0915 15 1   PT Evaluation: Moderate Complexity (74593)       PT Evaluation:High Complexity (56739)       Ther ex (51079) 0915 0945 30 2   Manual Therapy (95014)       Neuro re-ed (29555)       Massage (65949)       Estim unattended   (05724)           Electronically signed by Cass Lopez PT on 6/23/21 at 10:45 AM EDT

## 2021-06-23 NOTE — PROGRESS NOTES
[address]  PHYSICAL THERAPY PLAN OF CARE   [facility] [facility], [address] [place] [address]         Ph: [phone]  Fax: [phone]    [] Certification  [] Recertification [x]  Plan of Care  [] Progress Note [] Discharge      To:  Umu Colindres M.D. From:  Matilda Mendez PT  Patient: Lisa Juan     : 1970  Diagnosis: Biceps rupture, distal, left, subsequent encounter     Date: 2021  Treatment Diagnosis: S/P left distal bicep repair    Plan of Care/Certification Expiration Date: 21  Progress Report Period from:  2021  to 2021                   OBJECTIVE:   Short Term Goals - Time Frame for Short term goals: 3-7    Goals Current/Discharge status  Met   Short term goal 1: Increase range of motion of the left shoulder to full motion all planes (goal met)   [x] yes  [] no   Short term goal 2: Increase left elbow supination 0-90 degrees (goal met )   [x] yes  [] no   Short term goal 3: Pain decreased with activity to 5/10  5/10 with left arm movement specifically away from her body [] yes  [x] no   Short term goal 4:  Increase  strength by 10 to 20 lbs per each measurement on the left   [] yes  [x] no      []  yes  []  no     Long Term Goals - Time Frame for Long term goals : 8-16  Goals Current/ Discharge status Met   Long term goal 1: Left elbow full range of motion (goal met )  [x] yes  [] no   Long term goal 2: Strength of the left shoulder 5/5 (goal met )  [x] yes  [] no   Long term goal 3: Strength of left elbow 5/5  [x] yes  [] no   Long term goal 4: No further pain with use of the left elbow See above [] yes  [] no   Long term goal 5: Patient able to go back to full duty work Unable to tolerate regular work duty [] yes  [] no       [] yes  [] no       [] yes  [] no        Body structures, Functions, Activity limitations: Decreased strength, Increased pain (Unable to tolerate regular work duty)  Assessment: Patient's range of motion of her elbow is within normal limits. She does need strengthening of the left elbow and shoulder. Prognosis: Excellent  Discharge Recommendations: Continue to assess pending progress      PT Education: Home Exercise Program  Patient Education: Importance of focusing on eccentric vs concentric bicep exercises for maximum tendon strengthening and avoidance of shortened tendon position    PLAN: [x] Evaluate and Treat  Frequency/Duration:  Plan  Times per week: 2  Plan weeks: 6-8  Current Treatment Recommendations: Strengthening, Pain Management (work simulated activities)     Precautions:Restrictions/Precautions: Surgical Protocols        Spinal Precautions: No Lifting  Other position/activity restrictions: She is still off duty from work                  Patient Status:[x] Continue/ Initiate plan of Care    [] Discharge PT. Recommend pt continue with HEP. [] Additional visits requested, Please re-certify for additional visits:          Signature: Electronically signed by Vy Perez PT on 6/23/21 at 10:48 AM EDT      If you have any questions or concerns, please don't hesitate to call. Thank you for your referral.    I have reviewed this plan of care and certify a need for medically necessary rehabilitation services.     Physician Signature:__________________________________________________________  Date:  Please sign and return

## 2021-06-25 ENCOUNTER — HOSPITAL ENCOUNTER (OUTPATIENT)
Dept: PHYSICAL THERAPY | Age: 51
Setting detail: THERAPIES SERIES
Discharge: HOME | End: 2021-06-25
Payer: COMMERCIAL

## 2021-06-25 PROCEDURE — 97110 THERAPEUTIC EXERCISES: CPT | Performed by: PHYSICAL THERAPIST

## 2021-06-25 NOTE — PROGRESS NOTES
[facility]  Outpatient Physical Therapy    Treatment Note        Date: 2021  Patient: Yessenia Sharpe  : 1970  ACCT #: [de-identified]  Referring Practitioner: Elena Abrahma M.D. Diagnosis: Biceps rupture, distal, left, subsequent encounter  Treatment Diagnosis: S/P left distal bicep repair     Visit Information:  PT Visit Information  Onset Date: 21  PT Insurance Information: [facility]- claim #[id number]  Total # of Visits Approved: 16  Plan of Care/Certification Expiration Date: 21    Subjective: Patient feels that her brachialis muscle is still a problem     HEP Compliance:  [x] Good [] Fair [] Poor [] Reports not doing due to:    Vital Signs  Patient Currently in Pain: Yes   Pain Screening  Patient Currently in Pain: Yes    OBJECTIVE:   Exercises  Exercise 1: eccentric weighted curls with 3# (using R arm to perform flexion- 5-7\" slow lowering into extension) x10  Exercise 3: overhead tricep extension 3# 2x10  Exercise 4: Standing elbow flexion 2 way (thumb up, plam down) 3# x12 ea. Exercise 7: UBE level 2.5- 2 min forward and 2. minutes backwards. Exercise 12: Tricep extension on Apollo machine 30# 2x10  (focus on eccentric return)  Exercise 13: Lat pull down on Apollo machine 20# 2x10  Exercise 14: Rows on Apollo Machine 30# 2x10  Exercise 15: bicep stretch at tower 30''x3  Exercise 16: eccentric bicep curl w/ 5# x15(RUE helps w/ concentric movement to avoid quick fatigue)  Exercise 17: bent over row with focus on eccentric lowering 5# 2x15  Exercise 18: bent over reverse fly with 3# weight x15  Exercise 19: shoulder flex/abd to 90 deg 2# 2x15  Exercise 20: catch light weight ball palm up and arm in nuetral with 1 kg ball         Modalities:  Modalities  Cryotherapy (Minutes\Location):  Ice pack to the elbow after exercise     *Indicates exercise, modality, or manual techniques to be initiated when appropriate    Assessment:   Activity Tolerance  Activity Tolerance: Patient Tolerated treatment well    Body structures, Functions, Activity limitations: Decreased strength, Increased pain  Assessment: Patient still complains \"brachialis\" pain. Treatment Diagnosis: S/P left distal bicep repair  Prognosis: Excellent  PT Education: Home Exercise Program  Patient Education: Importance of focusing on eccentric vs concentric bicep exercises for maximum tendon strengthening and avoidance of shortened tendon position    Goals:  Short term goals  Time Frame for Short term goals: 3-7  Short term goal 1: Increase range of motion of the left shoulder to full motion all planes (goal met)  Short term goal 2: Increase left elbow supination 0-90 degrees (goal met )  Short term goal 3: Pain decreased with activity to 5/10  Short term goal 4: Increase  strength by 10 to 20 lbs per each measurement on the left    Long term goals  Time Frame for Long term goals : 8-16  Long term goal 1: Left elbow full range of motion (goal met )  Long term goal 2: Strength of the left shoulder 5/5 (goal met )  Long term goal 3: Strength of left elbow 5/5  Long term goal 4: No further pain with use of the left elbow  Long term goal 5: Patient able to go back to full duty work  Progress toward goals:    POST-PAIN       Pain Rating (0-10 pain scale):   5/10   Location and pain description same as pre-treatment unless indicated. Action: [] NA   [x] Perform HEP  [] Meds as prescribed  [] Modalities as prescribed   [] Call Physician     Frequency/Duration:  Plan  Times per week: 2  Plan weeks: 6-8  Current Treatment Recommendations: Strengthening, Pain Management (Unable to tolerate regular duty)     Pt to continue current HEP. See objective section for any therapeutic exercise changes, additions or modifications this date.          PT Individual Minutes  Time In: 7283  Time Out: 1000  Minutes: 50  Timed Code Treatment Minutes: 40 Minutes  Procedure Minutes:     Modality Time In Time Out Total Minutes Units    Ther ex ([id number]) 0910 0950 40 3   Manual Therapy ([id number])       Neuro re-ed (73060)       Massage ([id number])       Estim unattended   ([id number])         Signature:  Electronically signed by Daryn Lowe PT on 6/25/21 at 10:00 AM EDT

## 2021-06-29 NOTE — TELEPHONE ENCOUNTER
Pharmacy requesting medication refill.  Please approve or deny this request.    Rx requested:  Requested Prescriptions     Pending Prescriptions Disp Refills    albuterol sulfate  (90 Base) MCG/ACT inhaler [Pharmacy Med Name: albuterol sulfate HFA 90 mcg/actuation aerosol inhaler] 18 g 3     Sig: INHALE 2 PUFFS BY MOUTH EVERY 4 HOURS AS NEEDED         Last Office Visit:   12/7/2020      Next Visit Date:  Future Appointments   Date Time Provider Rodrigo Finn   6/30/2021  9:00 AM Hannah Ramirez [address]   7/2/2021  9:00 AM Hannah Garza [address]   7/7/2021  9:00 AM Hannah Garza [address]   7/9/2021  8:00 AM Hannah Garza Jose Alejandro [address]   7/14/2021  9:00 AM Laurita Mac, PTA [facility] [address]   7/16/2021  9:00 AM Laurita Mac PTA [facility] [address]   7/21/2021  9:00  [address]   7/26/2021  1:15 PM Felix Bradshaw MD [facility]

## 2021-06-30 ENCOUNTER — HOSPITAL ENCOUNTER (OUTPATIENT)
Dept: PHYSICAL THERAPY | Age: 51
Setting detail: THERAPIES SERIES
Discharge: HOME | End: 2021-06-30
Payer: COMMERCIAL

## 2021-06-30 PROCEDURE — 97110 THERAPEUTIC EXERCISES: CPT

## 2021-06-30 RX ORDER — ALBUTEROL SULFATE 90 UG/1
AEROSOL, METERED RESPIRATORY (INHALATION)
Qty: 18 G | Refills: 3 | Status: SHIPPED | OUTPATIENT
Start: 2021-06-30 | End: 2021-09-28

## 2021-06-30 NOTE — PROGRESS NOTES
[address] [facility]  Outpatient Physical Therapy    Treatment Note        Date: 2021  Patient: Amrit Marino  : 1970  ACCT #: [de-identified]  Referring Practitioner: Kathy Greenberg M.D. Diagnosis: Biceps rupture, distal, left, subsequent encounter  Treatment Diagnosis: S/P left distal bicep repair     Visit Information:  PT Visit Information  Onset Date: 21  PT Insurance Information: [facility]- claim #[id number]  Total # of Visits Approved: 16  Total # of Visits to Date: 12  Plan of Care/Certification Expiration Date: 21  No Show: 0  Canceled Appointment: 1  Progress Note Counter: 3/16 (per new C9 21-21)    Subjective: Pt reports no pain unless lifting something. Reports she is a \"good sore today. \" Worked out on her own Monday. HEP Compliance:  [x] Good [] Fair [] Poor [] Reports not doing due to:    Vital Signs  Patient Currently in Pain: Denies (at rest 5/10 with lifting >/=3#)   Pain Screening  Patient Currently in Pain: Denies (at rest 5/10 with lifting >/=3#)  Pain Assessment  Pain Assessment: 0-10    OBJECTIVE:   Exercises  Exercise 1: eccentric weighted curls with 3# (using R arm to perform flexion- 5-7\" slow lowering into extension) x15  Exercise 3: overhead tricep extension 3# 2x10  Exercise 4: Standing elbow flexion 2 way (thumb up, plam down) 3# x15 ea. Exercise 5: table top push ups x15  Exercise 6: Rebounder catch and throw 1 kg ball x15  Exercise 7: UBE level 3- 3 min forward and 3. minutes backwards.   Exercise 12: Tricep extension on Apollo machine 30# 2x10  (focus on eccentric return)  Exercise 13: Lat pull down on Apollo machine 20# 2x10  Exercise 14: Rows on Apollo Machine 30# 2x10  Exercise 15: bicep stretch at tower 30''x3  Exercise 17: bent over row with focus on eccentric lowering 5# 2x15  Exercise 18: bent over reverse fly with 3# weight x15  Exercise 19: shoulder flex/abd to 90 deg 2# 2x15  Exercise 20: catch light weight ball palm up and arm in nuetral with 1 kg ball x15-20    Strength: [x] NT  [] MMT completed:    ROM: [x] NT  [] ROM measurements:     Modalities:  Modalities  Cryotherapy (Minutes\Location): Ice pack to the L elbow after exercise x10 mins     *Indicates exercise, modality, or manual techniques to be initiated when appropriate    Assessment: Body structures, Functions, Activity limitations: Decreased strength, Increased pain  Assessment: Progressed ther ex to further address strength and stability with addition of table top push ups and rebounder catch and throw to increase tolerance to return to work cativity. PT with good tolerance though conts to c/o sensations around elbow and in \"brachialis. \"  Treatment Diagnosis: S/P left distal bicep repair  Prognosis: Excellent       Goals:  Short term goals  Time Frame for Short term goals: 3-7  Short term goal 1: Increase range of motion of the left shoulder to full motion all planes (goal met)  Short term goal 2: Increase left elbow supination 0-90 degrees (goal met )  Short term goal 3: Pain decreased with activity to 5/10  Short term goal 4: Increase  strength by 10 to 20 lbs per each measurement on the left    Long term goals  Time Frame for Long term goals : 8-16  Long term goal 1: Left elbow full range of motion (goal met )  Long term goal 2: Strength of the left shoulder 5/5 (goal met )  Long term goal 3: Strength of left elbow 5/5  Long term goal 4: No further pain with use of the left elbow  Long term goal 5: Patient able to go back to full duty work  Progress toward goals: Pt is showing good tolerance w/ Exercise Progression    POST-PAIN       Pain Rating (0-10 pain scale):  1 /10   Location and pain description same as pre-treatment unless indicated.    Action: [] NA   [] Perform HEP  [] Meds as prescribed  [] Modalities as prescribed   [] Call Physician     Frequency/Duration:  Plan  Times per week: 2  Plan weeks: 6-8  Current Treatment Recommendations: Strengthening, Pain Management (Unable to tolerate regular duty)     Pt to continue current HEP. See objective section for any therapeutic exercise changes, additions or modifications this date.     PT Individual Minutes  Time In: 0908  Time Out: 1004  Minutes: 56  Timed Code Treatment Minutes: 46 Minutes  Procedure Minutes: 10 Cp     Modality Time In Time Out Total Minutes Units    Ther ex ([id number]) 09:08 09:54 46 3   ColdPack ([id number]) 9:54 10:04 10 0     Signature:  Electronically signed by Shane Dave PT on 6/30/21 at 9:55 AM EDT

## 2021-07-02 ENCOUNTER — HOSPITAL ENCOUNTER (OUTPATIENT)
Dept: PHYSICAL THERAPY | Age: 51
Setting detail: THERAPIES SERIES
Discharge: HOME | End: 2021-07-02
Payer: COMMERCIAL

## 2021-07-02 PROCEDURE — 97110 THERAPEUTIC EXERCISES: CPT | Performed by: PHYSICAL THERAPIST

## 2021-07-02 PROCEDURE — 97124 MASSAGE THERAPY: CPT | Performed by: PHYSICAL THERAPIST

## 2021-07-02 NOTE — PROGRESS NOTES
30''x3  Exercise 16: eccentric bicep curl w/ 5# x15(RUE helps w/ concentric movement to avoid quick fatigue)  Exercise 17: bent over row with focus on eccentric lowering 5# 2x15  Exercise 18: bent over reverse fly with 3# weight x15  Exercise 19: shoulder flex/abd to 90 deg 2# 2x15  Exercise 20: catch light weight ball palm up and arm in nuetral with 1 kg ball x15-20         Manual:   Manual therapy  Soft Tissue Mobalization: soft tissue/trigger point release to L bicep and cross friction scar mobs to proximal incision; total x 10 minutes    Modalities:  Modalities  Cryotherapy (Minutes\Location): Ice pack to the L elbow after exercise x10 mins     *Indicates exercise, modality, or manual techniques to be initiated when appropriate    Assessment:   Activity Tolerance  Activity Tolerance: Patient Tolerated treatment well  Activity Tolerance: Patient still requires moderate verbal cues to peform the exercises correctly    Body structures, Functions, Activity limitations: Decreased strength, Increased pain  Assessment: Patient is slowly progressing. She is able to perform the exercises, with verbal cues to execute them correctly. Working with the ball and rebounder etc. to start to introduce a more ballistic movement to prepare her for return to work,  She is still quite focused on her \"brachalis\" and feels this area should be scanned to see if it is injured. Treatment Diagnosis: S/P left distal bicep repair  Prognosis: Excellent  PT Education: Home Exercise Program  Patient Education: Importance of focusing on eccentric vs concentric bicep exercises for maximum tendon strengthening and avoidance of shortened tendon position    Goals:  Short term goals  Time Frame for Short term goals: 3-7  Short term goal 1: Increase range of motion of the left shoulder to full motion all planes (goal met)  Short term goal 2:  Increase left elbow supination 0-90 degrees (goal met )  Short term goal 3: Pain decreased with activity to 5/10  Short term goal 4: Increase  strength by 10 to 20 lbs per each measurement on the left    Long term goals  Time Frame for Long term goals : 8-16  Long term goal 1: Left elbow full range of motion (goal met )  Long term goal 2: Strength of the left shoulder 5/5 (goal met )  Long term goal 3: Strength of left elbow 5/5  Long term goal 4: No further pain with use of the left elbow  Long term goal 5: Patient able to go back to full duty work  Progress toward goals:    POST-PAIN       Pain Rating (0-10 pain scale):  2 /10   Location and pain description same as pre-treatment unless indicated. Action: [] NA   [x] Perform HEP  [] Meds as prescribed  [] Modalities as prescribed   [] Call Physician     Frequency/Duration:  Plan  Times per week: 2  Current Treatment Recommendations: Strengthening, Pain Management (work simulation tasks)     Pt to continue current HEP. See objective section for any therapeutic exercise changes, additions or modifications this date.          PT Individual Minutes  Time In: 0902  Time Out: 1000  Minutes: 58  Timed Code Treatment Minutes: 48 Minutes  Procedure Minutes:     Modality Time In Time Out Total Minutes Units    Ther ex ([id number]) 0856 [id number] 38 3   Manual Therapy ([id number])       Neuro re-ed (71404)       Massage ([id number]) [id number] 4   Estim unattended   ([id number])         Signature:  Electronically signed by Jonathan Loza PT on 7/2/21 at 2:56 PM EDT

## 2021-07-07 ENCOUNTER — HOSPITAL ENCOUNTER (OUTPATIENT)
Dept: PHYSICAL THERAPY | Age: 51
Setting detail: THERAPIES SERIES
Discharge: HOME | End: 2021-07-07
Payer: COMMERCIAL

## 2021-07-07 PROCEDURE — 97124 MASSAGE THERAPY: CPT | Performed by: PHYSICAL THERAPIST

## 2021-07-07 PROCEDURE — 97110 THERAPEUTIC EXERCISES: CPT | Performed by: PHYSICAL THERAPIST

## 2021-07-07 ASSESSMENT — PAIN SCALES - GENERAL: PAINLEVEL_OUTOF10: 1

## 2021-07-07 ASSESSMENT — PAIN DESCRIPTION - ONSET: ONSET: ON-GOING

## 2021-07-07 ASSESSMENT — PAIN DESCRIPTION - PROGRESSION: CLINICAL_PROGRESSION: GRADUALLY IMPROVING

## 2021-07-07 ASSESSMENT — PAIN - FUNCTIONAL ASSESSMENT: PAIN_FUNCTIONAL_ASSESSMENT: PREVENTS OR INTERFERES SOME ACTIVE ACTIVITIES AND ADLS

## 2021-07-07 ASSESSMENT — PAIN DESCRIPTION - PAIN TYPE: TYPE: SURGICAL PAIN;CHRONIC PAIN

## 2021-07-07 ASSESSMENT — PAIN DESCRIPTION - DESCRIPTORS: DESCRIPTORS: ACHING

## 2021-07-07 ASSESSMENT — PAIN DESCRIPTION - FREQUENCY: FREQUENCY: INTERMITTENT

## 2021-07-07 ASSESSMENT — PAIN DESCRIPTION - LOCATION: LOCATION: ELBOW

## 2021-07-07 ASSESSMENT — PAIN DESCRIPTION - ORIENTATION: ORIENTATION: LEFT

## 2021-07-07 NOTE — PROGRESS NOTES
[address] [facility]  Outpatient Physical Therapy    Treatment Note        Date: 2021  Patient: Artemio Mayes  : 1970  ACCT #: [de-identified]  Referring Practitioner: Donald Jessica M.D. Diagnosis: Biceps rupture, distal, left, subsequent encounter  Treatment Diagnosis: S/P left distal bicep repair     Visit Information:  PT Visit Information  Onset Date: 21  PT Insurance Information: [facility]- claim #[id number]  Total # of Visits Approved: 16  Plan of Care/Certification Expiration Date: 21  Progress Note Counter:     Subjective: Patient is complaining of the lateral aspect of the bicep at the distal insertion, and also at the medial epicodyle. Comments: New C-9 obtained with end date of 21  HEP Compliance:  [x] Good [] Fair [] Poor [] Reports not doing due to:    Vital Signs  Patient Currently in Pain: Yes   Pain Screening  Patient Currently in Pain: Yes  Pain Assessment  Pain Assessment: 0-10  Pain Level: 1  Patient's Stated Pain Goal: No pain  Pain Type: Surgical pain;Chronic pain  Pain Location: Elbow  Pain Orientation: Left  Pain Descriptors: Aching  Pain Frequency: Intermittent  Pain Onset: On-going  Clinical Progression: Gradually improving  Functional Pain Assessment: Prevents or interferes some active activities and ADLs    OBJECTIVE:   Exercises  Exercise 1: eccentric weighted curls with 3# (using R arm to perform flexion- 5-7\" slow lowering into extension) x15  Exercise 4: Standing elbow flexion 2 way (thumb up, plam down) 3# x15 ea. Exercise 5: table top push ups x15  Exercise 7: UBE level 3- 3 min forward and 3. minutes backwards. Exercise 8: Elbow extension/flexion with shoulder at 90 degrees 10 reps. Exercise 9: weighted hammer supination, pronation 2.5# elbow flexed x10, no added weight elbow ext x10  Exercise 11: Resisted exerciser for supination. 10 reps  Exercise 12: Tricep extension on Apollo machine 30# 2x10  (focus on eccentric return)  Exercise 13: Lat pull down on Apollo machine 20# 2x10  Exercise 14: Rows on Apollo Machine 40# 10 reps  Exercise 15: bicep stretch at tower 30''x3  Exercise 16: eccentric bicep curl w/ 5# x15(RUE helps w/ concentric movement to avoid quick fatigue)  Exercise 17: bent over row with focus on eccentric lowering 5# 2x15  Exercise 18: bent over reverse fly with 3# weight x15  Exercise 20: catch light weight ball palm up and arm in nuetral with 1 kg ball x15-20       Strength: [] NT  [x] MMT completed:      Strength LUE  L Shoulder Flexion: 5/5  L Shoulder Extension: 5/5  L Shoulder ABduction: 5/5  L Shoulder Internal Rotation: 4+/5  L Shoulder External Rotation: 4+/5  L Elbow Flexion: 4+/5  L Elbow Extension: 4+/5  L Forearm Pron: 4+/5  L Forearm Sup: 4+/5       ROM: [] NT  [x] ROM measurements:         AROM LUE (degrees)  L Shoulder Flexion 0-180: 0-180  L Shoulder Extension 0-45: 0-45  L Shoulder ABduction 0-180: 0-180  L Shoulder Int Rotation  0-70: 0-70  L Shoulder Ext Rotation  0-90: 0-90  L Elbow Flexion 0-145: 0-150  L Elbow Extension 145-0: 0  L Forearm Pron 0-90: 0-90  L Forearm Supination  0-90: 0-90  L Wrist Flexion 0-80: 0-80  L Wrist Extension 0-70: 0-70     Manual:   Manual therapy  Soft Tissue Mobalization: cross friction massage and trigger point release to the left bicep and lateral to the left bicep    Modalities:  Modalities  Cryotherapy (Minutes\Location):  Ice pack to the L elbow after exercise x10 mins     *Indicates exercise, modality, or manual techniques to be initiated when appropriate    Assessment:   Activity Tolerance  Activity Tolerance: Patient Tolerated treatment well  Activity Tolerance: Patient tolerated exercises well today, demonstrated improved exercise execution    Body structures, Functions, Activity limitations: Decreased strength, Increased pain  Assessment: Patient still has an area on the lateral distal bicep that still has tissue restrictions, however this has lessened as compared to last week.  She demonstrated improved exercise execution i.e. eccentric contractions. She still needs work on ballistic movements to prepare her for return to work. Treatment Diagnosis: S/P left distal bicep repair  Prognosis: Excellent  PT Education: Home Exercise Program  Patient Education: Importance of focusing on eccentric vs concentric bicep exercises for maximum tendon strengthening and avoidance of shortened tendon position    Goals:  Short term goals  Time Frame for Short term goals: 3-7  Short term goal 1: Increase range of motion of the left shoulder to full motion all planes (goal met)  Short term goal 2: Increase left elbow supination 0-90 degrees (goal met )  Short term goal 3: Pain decreased with activity to 5/10  (goal met )  Short term goal 4: Increase  strength by 10 to 20 lbs per each measurement on the left    Long term goals  Time Frame for Long term goals : 8-16  Long term goal 1: Left elbow full range of motion (goal met )  Long term goal 2: Strength of the left shoulder 5/5 (goal met )  Long term goal 3: Strength of left elbow 5/5  Long term goal 4: No further pain with use of the left elbow  Long term goal 5: Patient able to go back to full duty work  Progress toward goals: good    POST-PAIN       Pain Rating (0-10 pain scale):  1 /10   Location and pain description same as pre-treatment unless indicated. Action: [] NA   [x] Perform HEP  [] Meds as prescribed  [] Modalities as prescribed   [] Call Physician     Frequency/Duration:  Plan  Times per week: 2  Plan weeks: 6-8  Current Treatment Recommendations: Strengthening, Pain Management (work simulation activities)     Pt to continue current HEP. See objective section for any therapeutic exercise changes, additions or modifications this date.          PT Individual Minutes  Time In: 2067  Time Out: 1007  Minutes: 62  Timed Code Treatment Minutes: 52 Minutes  Procedure Minutes:     Modality Time In Time Out Total Minutes Units    Ther ex

## 2021-07-09 ENCOUNTER — HOSPITAL ENCOUNTER (OUTPATIENT)
Dept: PHYSICAL THERAPY | Age: 51
Setting detail: THERAPIES SERIES
Discharge: HOME | End: 2021-07-09
Payer: COMMERCIAL

## 2021-07-09 PROCEDURE — 97124 MASSAGE THERAPY: CPT | Performed by: PHYSICAL THERAPIST

## 2021-07-09 PROCEDURE — 97110 THERAPEUTIC EXERCISES: CPT | Performed by: PHYSICAL THERAPIST

## 2021-07-09 ASSESSMENT — PAIN DESCRIPTION - PAIN TYPE: TYPE: SURGICAL PAIN;CHRONIC PAIN

## 2021-07-09 ASSESSMENT — PAIN - FUNCTIONAL ASSESSMENT: PAIN_FUNCTIONAL_ASSESSMENT: PREVENTS OR INTERFERES SOME ACTIVE ACTIVITIES AND ADLS

## 2021-07-09 ASSESSMENT — PAIN DESCRIPTION - PROGRESSION: CLINICAL_PROGRESSION: GRADUALLY IMPROVING

## 2021-07-09 ASSESSMENT — PAIN SCALES - GENERAL: PAINLEVEL_OUTOF10: 1

## 2021-07-09 ASSESSMENT — PAIN DESCRIPTION - DESCRIPTORS: DESCRIPTORS: ACHING

## 2021-07-09 ASSESSMENT — PAIN DESCRIPTION - FREQUENCY: FREQUENCY: INTERMITTENT

## 2021-07-09 ASSESSMENT — PAIN DESCRIPTION - LOCATION: LOCATION: ELBOW

## 2021-07-09 ASSESSMENT — PAIN DESCRIPTION - ONSET: ONSET: ON-GOING

## 2021-07-09 NOTE — PROGRESS NOTES
[address] [facility]  Outpatient Physical Therapy    Treatment Note        Date: 2021  Patient: Kimani Mayfield  : 1970  ACCT #: [de-identified]  Referring Practitioner: Donavan Umana M.D. Diagnosis: Biceps rupture, distal, left, subsequent encounter  Treatment Diagnosis: S/P left distal bicep repair     Visit Information:  PT Visit Information  Onset Date: 21  Total # of Visits Approved: 16  Plan of Care/Certification Expiration Date: 21  Progress Note Counter:     Subjective: Patient states that she is a bit achy today. Comments: New C-9 obtained with end date of 21  HEP Compliance:  [x] Good [] Fair [] Poor [] Reports not doing due to:    Vital Signs  Patient Currently in Pain: Yes   Pain Screening  Patient Currently in Pain: Yes  Pain Assessment  Pain Assessment: 0-10  Pain Level: 1  Patient's Stated Pain Goal: No pain  Pain Type: Surgical pain;Chronic pain  Pain Location: Elbow  Pain Descriptors: Aching  Pain Frequency: Intermittent  Pain Onset: On-going  Clinical Progression: Gradually improving  Functional Pain Assessment: Prevents or interferes some active activities and ADLs    OBJECTIVE:   Exercises  Exercise 1: eccentric weighted curls with 3# (using R arm to perform flexion- 5-7\" slow lowering into extension) x15  Exercise 4: Standing elbow flexion 2 way (thumb up, plam down) 3# x15 ea. Exercise 5: table top push ups x15  Exercise 7: UBE level 3- 3 min forward and 3. minutes backwards.   Exercise 9: weighted hammer supination, pronation 2.5# elbow flexed x10, no added weight elbow ext x10  Exercise 12: Tricep extension on Apollo machine 30# 2x10  (focus on eccentric return)  Exercise 13: Lat pull down on Apollo machine 20# 2x10  Exercise 14: Rows on Apollo Machine 40# 10 reps  Exercise 15: bicep stretch at tower 30''x3  Exercise 16: eccentric bicep curl w/ 5# x15(RUE helps w/ concentric movement to avoid quick fatigue)  Exercise 17: bent over row with focus on eccentric lowering 5# 2x15  Exercise 18: bent over reverse fly with 3# weight x15  Exercise 19: shoulder flex/abd to 90 deg 2# 2x15  Exercise 20: catch light weight ball palm up and arm in nuetral with 1 kg ball x15-20         Strength: [] NT  [x] MMT completed:      Strength LUE  L Shoulder Flexion: 5/5  L Shoulder Extension: 5/5  L Shoulder ABduction: 5/5  L Elbow Flexion: 4+/5  L Elbow Extension: 5/5  L Forearm Pron: 5/5  L Forearm Sup: 4+/5       ROM: [] NT  [x] ROM measurements:         AROM LUE (degrees)  L Shoulder Flexion 0-180: 0-180  L Shoulder Extension 0-45: 0-45  L Shoulder ABduction 0-180: 0-180  L Shoulder Int Rotation  0-70: 0-70  L Shoulder Ext Rotation  0-90: 0-90  L Elbow Flexion 0-145: 0-150  L Forearm Pron 0-90: 0-90  L Wrist Flexion 0-80: 0-80  L Wrist Extension 0-70: 0-70     Manual:   Manual therapy  Soft Tissue Mobalization: cross friction massage and trigger point release to the left bicep and lateral to the left bicep    Modalities:  Modalities  Cryotherapy (Minutes\Location): Ice pack to the L elbow after exercise x10 mins     *Indicates exercise, modality, or manual techniques to be initiated when appropriate    Assessment:   Activity Tolerance  Activity Tolerance: Patient continues to tolerate the exercises well today. Body structures, Functions, Activity limitations: Decreased strength, Increased pain  Assessment: Soft tissue restrictions on the lateral distal bicep is improving, mininal restrictions palpated today. Doing well with exercise execution, and will consider increasing her weights next week.   Her strength is improving, her range of moiton is normal.  Treatment Diagnosis: S/P left distal bicep repair  Prognosis: Excellent  PT Education: Home Exercise Program  Patient Education: Importance of focusing on eccentric vs concentric bicep exercises for maximum tendon strengthening and avoidance of shortened tendon position    Goals:  Short term goals  Time Frame for Short term goals: 3-7  Short term goal 1: Increase range of motion of the left shoulder to full motion all planes (goal met)  Short term goal 2: Increase left elbow supination 0-90 degrees (goal met )  Short term goal 3: Pain decreased with activity to 5/10  (goal met )  Short term goal 4: Increase  strength by 10 to 20 lbs per each measurement on the left    Long term goals  Time Frame for Long term goals : 8-16  Long term goal 1: Left elbow full range of motion (goal met )  Long term goal 2: Strength of the left shoulder 5/5 (goal met )  Long term goal 3: Strength of left elbow 5/5  Long term goal 4: No further pain with use of the left elbow  Long term goal 5: Patient able to go back to full duty work  Progress toward goals: Continues to improve    POST-PAIN       Pain Rating (0-10 pain scale):  1 /10   Location and pain description same as pre-treatment unless indicated. Action: [] NA   [x] Perform HEP  [] Meds as prescribed  [] Modalities as prescribed   [] Call Physician     Frequency/Duration:  Plan  Times per week: 2  Current Treatment Recommendations: Strengthening, Pain Management (work simulation activity)     Pt to continue current HEP. See objective section for any therapeutic exercise changes, additions or modifications this date.          PT Individual Minutes  Time In: 0800  Time Out: 1759  Minutes: 50  Timed Code Treatment Minutes: 50 Minutes  Procedure Minutes:     Modality Time In Time Out Total Minutes Units    Ther ex ([id number]) [id number] 81 3   Manual Therapy ([id number])       Neuro re-ed (19829)       Massage ([id number]) 0800 0318 00 1   Estim unattended   ([id number])         Signature:  Electronically signed by Tayo Kan PT on 7/9/21 at 10:52 AM EDT

## 2021-07-14 ENCOUNTER — HOSPITAL ENCOUNTER (OUTPATIENT)
Dept: PHYSICAL THERAPY | Age: 51
Setting detail: THERAPIES SERIES
Discharge: HOME | End: 2021-07-14
Payer: COMMERCIAL

## 2021-07-14 PROCEDURE — 97110 THERAPEUTIC EXERCISES: CPT

## 2021-07-14 ASSESSMENT — PAIN DESCRIPTION - ORIENTATION: ORIENTATION: LEFT

## 2021-07-14 ASSESSMENT — PAIN SCALES - GENERAL: PAINLEVEL_OUTOF10: 3

## 2021-07-14 ASSESSMENT — PAIN DESCRIPTION - LOCATION: LOCATION: ELBOW

## 2021-07-14 ASSESSMENT — PAIN DESCRIPTION - PAIN TYPE: TYPE: SURGICAL PAIN;CHRONIC PAIN

## 2021-07-14 ASSESSMENT — PAIN DESCRIPTION - DESCRIPTORS: DESCRIPTORS: ACHING

## 2021-07-14 NOTE — PROGRESS NOTES
[address] [facility]  Outpatient Physical Therapy    Treatment Note        Date: 2021  Patient: Sona Reyes  : 1970  ACCT #: [de-identified]  Referring Practitioner: Ginna Ballesteros M.D. Diagnosis: Biceps rupture, distal, left, subsequent encounter  Treatment Diagnosis: S/P left distal bicep repair     Visit Information:  PT Visit Information  Onset Date: 21  Total # of Visits Approved: 16  Total # of Visits to Date: 16  Plan of Care/Certification Expiration Date: 21  No Show: 0  Canceled Appointment: 1  Progress Note Counter:     Subjective: Pt denies pain at rest, but reports 3/10 pain w/ bicep curl  Comments: New C-9 obtained with end date of 21  HEP Compliance:  [x] Good [] Fair [] Poor [] Reports not doing due to:    Vital Signs  Patient Currently in Pain: Yes   Pain Screening  Patient Currently in Pain: Yes  Pain Assessment  Pain Assessment: 0-10  Pain Level: 3  Pain Type: Surgical pain;Chronic pain  Pain Location: Elbow  Pain Orientation: Left  Pain Descriptors: Aching    OBJECTIVE:   Exercises  Exercise 5: table top push ups x15  Exercise 7: UBE level 3- 3 min forward and 3. minutes backwards. Exercise 12: Tricep extension on Apollo machine 30# 2x10  (focus on eccentric return)  Exercise 13: Lat pull down on Apollo machine 30# 2x10  Exercise 14: Rows on Apollo Machine 40# 2x15  Exercise 16: eccentric bicep curl w/ 5# 2x15(RUE helps w/ concentric movement to avoid quick fatigue)  Exercise 17: bent over row with focus on eccentric lowering 5# 2x15; bent over high row 5# 2x10  Exercise 18: bent over reverse fly with 4# weight x15  Exercise 19: shoulder flex/abd to 90 deg 3# 2x15  Exercise 20: catch light weight ball palm up and arm in nuetral with 1 kg ball x15-20    Strength: [x] NT  [] MMT completed:    ROM: [x] NT  [] ROM measurements:     Modalities:  Modalities  Cryotherapy (Minutes\Location):  Ice pack to the L elbow after exercise x10 mins     *Indicates exercise, modality, or manual techniques to be initiated when appropriate    Assessment: Body structures, Functions, Activity limitations: Decreased strength, Increased pain  Assessment: Cont to increase resistance as tolerated. Pt reports most pain when elbow is further away from body and also w/ any form of bicep curl. Treatment Diagnosis: S/P left distal bicep repair  Prognosis: Excellent     Goals:  Short term goals  Time Frame for Short term goals: 3-7  Short term goal 1: Increase range of motion of the left shoulder to full motion all planes (goal met)  Short term goal 2: Increase left elbow supination 0-90 degrees (goal met )  Short term goal 3: Pain decreased with activity to 5/10  (goal met )  Short term goal 4: Increase  strength by 10 to 20 lbs per each measurement on the left  Long term goals  Time Frame for Long term goals : 8-16  Long term goal 1: Left elbow full range of motion (goal met )  Long term goal 2: Strength of the left shoulder 5/5 (goal met )  Long term goal 3: Strength of left elbow 5/5  Long term goal 4: No further pain with use of the left elbow  Long term goal 5: Patient able to go back to full duty work  Progress toward goals: increase functional strength w/ decreasing pain    POST-PAIN       Pain Rating (0-10 pain scale):   \"sore and prickly\"/10   Location and pain description same as pre-treatment unless indicated. Action: [] NA   [x] Perform HEP  [] Meds as prescribed  [] Modalities as prescribed   [] Call Physician     Frequency/Duration:  Plan  Times per week: 2  Current Treatment Recommendations: Strengthening, Pain Management (work simulation activity)     Pt to continue current HEP. See objective section for any therapeutic exercise changes, additions or modifications this date.     PT Individual Minutes  Time In: 0901  Time Out: 5904  Minutes: 50  Timed Code Treatment Minutes: 40 Minutes  Procedure Minutes: 10 CP     Modality Time In Time Out Total Minutes Units Ther ex ([id number]) [phone] 40 3   CP (37143) [phone] 10 0     Signature:  Electronically signed by Yanci Mejia PTA on 7/14/21 at 9:05 AM EDT

## 2021-07-16 ENCOUNTER — HOSPITAL ENCOUNTER (OUTPATIENT)
Dept: PHYSICAL THERAPY | Age: 51
Setting detail: THERAPIES SERIES
Discharge: HOME | End: 2021-07-16
Payer: COMMERCIAL

## 2021-07-16 PROCEDURE — 97110 THERAPEUTIC EXERCISES: CPT

## 2021-07-16 ASSESSMENT — PAIN DESCRIPTION - PAIN TYPE: TYPE: SURGICAL PAIN;CHRONIC PAIN

## 2021-07-16 ASSESSMENT — PAIN SCALES - GENERAL: PAINLEVEL_OUTOF10: 3

## 2021-07-16 ASSESSMENT — PAIN DESCRIPTION - ORIENTATION: ORIENTATION: LEFT

## 2021-07-16 ASSESSMENT — PAIN DESCRIPTION - LOCATION: LOCATION: ELBOW

## 2021-07-16 NOTE — PROGRESS NOTES
[address] [facility]  Outpatient Physical Therapy    Treatment Note        Date: 2021  Patient: Ivette Mccoy  : 1970  ACCT #: [de-identified]  Referring Practitioner: Gerardo Mar M.D. Diagnosis: Biceps rupture, distal, left, subsequent encounter        Visit Information:  PT Visit Information  Onset Date: 21  PT Insurance Information: [facility]- claim #[id number]  Total # of Visits Approved: 16  Total # of Visits to Date: 18  No Show: 0  Canceled Appointment: 1  Progress Note Counter:     Subjective: I'm having trouble with bicep activities and pulling open heavy doors, pushing activies don't bother me. HEP Compliance:  [x] Good [] Fair [] Poor [] Reports not doing due to:    Vital Signs  Patient Currently in Pain: Yes   Pain Screening  Patient Currently in Pain: Yes  Pain Assessment  Pain Assessment: 0-10  Pain Level: 3  Pain Type: Surgical pain;Chronic pain  Pain Location: Elbow  Pain Orientation: Left    OBJECTIVE:   Exercises  Exercise 3: Chest Press with eccentric return 5 pl 2 x 10  Exercise 4: Standing elbow flexion 2 way (thumb up, plam down) 5# x15 ea. Exercise 5: table top push ups x15  Exercise 6: Rebounder catch and throw 1 kg ball x15  Exercise 7: UBE level 3- 3 min forward and 3. minutes backwards. Exercise 9: weighted hammer supination, pronation 2.5# elbow flexed x10, no added weight elbow ext x10  Exercise 12: Tricep extension on Apollo machine 30# 2x15  (focus on eccentric return)  Exercise 13: Lat pull down on Apollo machine 40# 2x15  Exercise 14: Rows on Apollo Machine 40# 2x15  Exercise 16: eccentric bicep curl w/ 5# 2x15(RUE helps w/ concentric movement to avoid quick fatigue)  Exercise 20: catch light weight ball palm up and arm in nuetral with 1 kg ball x15-20       Strength: [x] NT  [] MMT completed:        ROM: [x] NT  [] ROM measurements:         Modalities:  Modalities  Cryotherapy (Minutes\Location):  Ice pack to the L elbow after exercise x10 mins *Indicates exercise, modality, or manual techniques to be initiated when appropriate    Assessment: Body structures, Functions, Activity limitations: Decreased strength, Increased pain  Assessment: Progression of exercises with increased reps and #'s, c/o pain with elbow flexion motions with pt able to work through current pain levels today. Prognosis: Excellent       Goals:  Short term goals  Time Frame for Short term goals: 3-7  Short term goal 1: Increase range of motion of the left shoulder to full motion all planes (goal met)  Short term goal 2: Increase left elbow supination 0-90 degrees (goal met )  Short term goal 3: Pain decreased with activity to 5/10  (goal met )  Short term goal 4: Increase  strength by 10 to 20 lbs per each measurement on the left    Long term goals  Time Frame for Long term goals : 8-16  Long term goal 1: Left elbow full range of motion (goal met )  Long term goal 2: Strength of the left shoulder 5/5 (goal met )  Long term goal 3: Strength of left elbow 5/5  Long term goal 4: No further pain with use of the left elbow  Long term goal 5: Patient able to go back to full duty work  Progress toward goals:progression of exercises with #'s / reps     POST-PAIN       Pain Rating (0-10 pain scale):   0/10 no pain # given, \"prickly and sore\"  Location and pain description same as pre-treatment unless indicated. Action: [] NA   [] Perform HEP  [] Meds as prescribed  [] Modalities as prescribed   [] Call Physician     Frequency/Duration:  Plan  Times per week: 2  Plan weeks: 6-8  Current Treatment Recommendations: Strengthening, Pain Management     Pt to continue current HEP. See objective section for any therapeutic exercise changes, additions or modifications this date.          PT Individual Minutes  Time In: 6089  Time Out: 6384  Minutes: 50  Timed Code Treatment Minutes: 40 Minutes  Procedure Minutes:10 CP      Modality Time In Time Out Total Minutes Units    Ther ex [phone] [id number] [phone]     ([id number]) 0061 3031 10 0     Signature:  Electronically signed by Jarrod Winchester PTA on 7/16/21 at 11:47 AM EDT

## 2021-07-21 ENCOUNTER — HOSPITAL ENCOUNTER (OUTPATIENT)
Dept: PHYSICAL THERAPY | Age: 51
Setting detail: THERAPIES SERIES
Discharge: HOME | End: 2021-07-21
Payer: COMMERCIAL

## 2021-07-21 PROCEDURE — 97124 MASSAGE THERAPY: CPT | Performed by: PHYSICAL THERAPIST

## 2021-07-21 PROCEDURE — 97110 THERAPEUTIC EXERCISES: CPT | Performed by: PHYSICAL THERAPIST

## 2021-07-21 ASSESSMENT — PAIN - FUNCTIONAL ASSESSMENT: PAIN_FUNCTIONAL_ASSESSMENT: PREVENTS OR INTERFERES SOME ACTIVE ACTIVITIES AND ADLS

## 2021-07-21 ASSESSMENT — PAIN DESCRIPTION - LOCATION: LOCATION: ELBOW

## 2021-07-21 ASSESSMENT — PAIN DESCRIPTION - PAIN TYPE: TYPE: SURGICAL PAIN;CHRONIC PAIN

## 2021-07-21 ASSESSMENT — PAIN DESCRIPTION - ORIENTATION: ORIENTATION: LEFT

## 2021-07-21 ASSESSMENT — PAIN DESCRIPTION - ONSET: ONSET: ON-GOING

## 2021-07-21 ASSESSMENT — PAIN DESCRIPTION - DESCRIPTORS: DESCRIPTORS: ACHING

## 2021-07-21 ASSESSMENT — PAIN DESCRIPTION - FREQUENCY: FREQUENCY: INTERMITTENT

## 2021-07-21 ASSESSMENT — PAIN SCALES - GENERAL: PAINLEVEL_OUTOF10: 1

## 2021-07-21 ASSESSMENT — PAIN DESCRIPTION - PROGRESSION: CLINICAL_PROGRESSION: GRADUALLY IMPROVING

## 2021-07-21 NOTE — PROGRESS NOTES
16: eccentric bicep curl w/ 5# 2x15(RUE helps w/ concentric movement to avoid quick fatigue)  Exercise 17: bent over row with focus on eccentric lowering 5# 2x15; bent over high row 5# 2x10  Exercise 18: bent over reverse fly with 4# weight x15  Exercise 19: shoulder flex/abd to 90 deg 3# 2x15  Exercise 20: catch light weight ball palm up and arm in nuetral with 1 kg ball x15-20         Manual:   Manual therapy  Soft Tissue Mobalization: cross friction massage and trigger point release to the left bicep and lateral to the left bicep    Modalities:  Modalities  Cryotherapy (Minutes\Location): Ice pack to the L elbow after exercise x10 mins     *Indicates exercise, modality, or manual techniques to be initiated when appropriate    Assessment:   Activity Tolerance  Activity Tolerance: Patient is tolerating the exercises well, and demonstrating good form    Body structures, Functions, Activity limitations: Decreased strength, Increased pain  Assessment: Progressing well with exercise. Ballistic exercises do cause some minor pain, otherwise she is continueing to gain strength of her left elbow  Treatment Diagnosis: S/P left distal bicep repair  Prognosis: Excellent  PT Education: Home Exercise Program  Patient Education: Importance of focusing on eccentric vs concentric bicep exercises for maximum tendon strengthening and avoidance of shortened tendon position    Goals:  Short term goals  Time Frame for Short term goals: 3-7  Short term goal 1: Increase range of motion of the left shoulder to full motion all planes (goal met)  Short term goal 2: Increase left elbow supination 0-90 degrees (goal met )  Short term goal 3: Pain decreased with activity to 5/10  (goal met )  Short term goal 4:  Increase  strength by 10 to 20 lbs per each measurement on the left (goal met)    Long term goals  Time Frame for Long term goals : 8-16  Long term goal 1: Left elbow full range of motion (goal met )  Long term goal 2: Strength of the left shoulder 5/5 (goal met )  Long term goal 3: Strength of left elbow 5/5  Long term goal 4: No further pain with use of the left elbow  Long term goal 5: Patient able to go back to full duty work  Progress toward goals:    POST-PAIN       Pain Rating (0-10 pain scale):  1 /10   Location and pain description same as pre-treatment unless indicated. Action: [] NA   [x] Perform HEP  [] Meds as prescribed  [] Modalities as prescribed   [] Call Physician     Frequency/Duration:  Plan  Times per week: 2  Plan weeks: 6-8  Current Treatment Recommendations: Strengthening, Pain Management     Pt to continue current HEP. See objective section for any therapeutic exercise changes, additions or modifications this date.          PT Individual Minutes  Time In: 0900  Time Out: 1010  Minutes: 70  Timed Code Treatment Minutes: 60 Minutes  Procedure Minutes:     Modality Time In Time Out Total Minutes Units    Ther ex ([id number]) 0910 1000 50 3   Manual Therapy ([id number])       Neuro re-ed (14111)       Massage ([id number]) 0900 8017 10 1   Estim unattended   (08326)         Signature:  Electronically signed by Fawn Arreola PT on 7/21/21 at 10:08 AM EDT

## 2021-07-23 ENCOUNTER — HOSPITAL ENCOUNTER (OUTPATIENT)
Dept: PHYSICAL THERAPY | Age: 51
Setting detail: THERAPIES SERIES
Discharge: HOME | End: 2021-07-23
Payer: COMMERCIAL

## 2021-07-23 PROCEDURE — 97140 MANUAL THERAPY 1/> REGIONS: CPT

## 2021-07-23 PROCEDURE — 97110 THERAPEUTIC EXERCISES: CPT

## 2021-07-23 NOTE — PROGRESS NOTES
x10 mins     *Indicates exercise, modality, or manual techniques to be initiated when appropriate    Assessment: Body structures, Functions, Activity limitations: Decreased strength, Increased pain  Assessment: Initiated higher drop w/ 1 kg ball and farmers carry w/ 22# box to simulate work duties w/ pt reporting increased fatigue and discomfort. Treatment Diagnosis: S/P left distal bicep repair  Prognosis: Excellent     Goals:  Short term goals  Time Frame for Short term goals: 3-7  Short term goal 1: Increase range of motion of the left shoulder to full motion all planes (goal met)  Short term goal 2: Increase left elbow supination 0-90 degrees (goal met )  Short term goal 3: Pain decreased with activity to 5/10  (goal met )  Short term goal 4: Increase  strength by 10 to 20 lbs per each measurement on the left (goal met)  Long term goals  Time Frame for Long term goals : 8-16  Long term goal 1: Left elbow full range of motion (goal met )  Long term goal 2: Strength of the left shoulder 5/5 (goal met )  Long term goal 3: Strength of left elbow 5/5  Long term goal 4: No further pain with use of the left elbow  Long term goal 5: Patient able to go back to full duty work  Progress toward goals: increase elbow strength    POST-PAIN       Pain Rating (0-10 pain scale):   0/10   Location and pain description same as pre-treatment unless indicated. Action: [] NA   [x] Perform HEP  [] Meds as prescribed  [] Modalities as prescribed   [] Call Physician     Frequency/Duration:  Plan  Times per week: 2  Plan weeks: 6-8  Current Treatment Recommendations: Strengthening, Pain Management     Pt to continue current HEP. See objective section for any therapeutic exercise changes, additions or modifications this date.     PT Individual Minutes  Time In: 7618  Time Out: 1343  Minutes: 51  Timed Code Treatment Minutes: 41 Minutes  Procedure Minutes: 10     Modality Time In Time Out Total Minutes Units    Ther ex (48095) 1124 1156 33 2   Manual Therapy (89636) 3409 3241 8 1   CP (70157) 3070 4972 10 0     Signature:  Electronically signed by Seferino Powell PTA on 7/23/21 at 11:30 AM EDT DISPLAY PLAN FREE TEXT DISPLAY PLAN FREE TEXT DISPLAY PLAN FREE TEXT DISPLAY PLAN FREE TEXT DISPLAY PLAN FREE TEXT DISPLAY PLAN FREE TEXT DISPLAY PLAN FREE TEXT DISPLAY PLAN FREE TEXT DISPLAY PLAN FREE TEXT DISPLAY PLAN FREE TEXT

## 2021-07-26 ENCOUNTER — OFFICE VISIT (OUTPATIENT)
Dept: ORTHOPEDIC SURGERY | Age: 51
End: 2021-07-26
Payer: COMMERCIAL

## 2021-07-26 VITALS
HEIGHT: 67 IN | HEART RATE: 85 BPM | OXYGEN SATURATION: 98 % | BODY MASS INDEX: 23.39 KG/M2 | WEIGHT: 149 LBS | TEMPERATURE: 97.2 F

## 2021-07-26 DIAGNOSIS — S46.212D RUPTURE OF LEFT DISTAL BICEPS TENDON, SUBSEQUENT ENCOUNTER: Primary | ICD-10-CM

## 2021-07-26 PROCEDURE — 99213 OFFICE O/P EST LOW 20 MIN: CPT | Performed by: ORTHOPAEDIC SURGERY

## 2021-07-26 NOTE — PROGRESS NOTES
Subjective:      Patient ID: Marylee Lob is a 53 y.o. female who presents today for:  Chief Complaint   Patient presents with    Follow-up     6 week follow up on Rupture of left distal biceps tendon. She was told by PT that she should ask for an injection. She is still having pain on the outside of her bicep. HPI:    Guy Rogers she is she has been advancing in physical therapy, and actually feels that her strength is slowly improving. The biceps is doing well, however she continues to have the lateral brachialis muscle pain. Allergies   Allergen Reactions    Azithromycin Other (See Comments)     BRUISING    Clarithromycin      Other reaction(s): Unknown  Causes Bruises    Molds & Smuts      Other reaction(s): Intolerance    Seasonal      Other reaction(s): Intolerance     Current Outpatient Medications on File Prior to Visit   Medication Sig Dispense Refill    albuterol sulfate  (90 Base) MCG/ACT inhaler INHALE 2 PUFFS BY MOUTH EVERY 4 HOURS AS NEEDED 18 g 3    levothyroxine (SYNTHROID) 125 MCG tablet Take 1 tablet by mouth Daily 60 tablet 2    MELATONIN GUMMIES PO Take by mouth daily as needed      indomethacin (INDOCIN) 25 MG capsule Take 1 capsule by mouth 2 times daily (with meals) 20 capsule 0    fluticasone (FLONASE) 50 MCG/ACT nasal spray 2 sprays by Nasal route daily 1 Bottle 0    doxyLAMINE succinate (GNP SLEEP AID) 25 MG tablet Take 1 tablet by mouth nightly as needed for Sleep 30 tablet 1    Multiple Vitamin (MVI, CELEBRATE, CHEWABLE TABLET) Take 1 tablet by mouth daily      indomethacin (INDOCIN) 25 MG capsule Take 1 capsule by mouth 2 times daily (with meals) for 10 days 20 capsule 0     No current facility-administered medications on file prior to visit. Acute and Chronic Pain Monitoring:   No flowsheet data found.       Objective--Physical Examination:     Pulse 85   Temp 97.2 °F (36.2 °C) (Temporal)   Ht 5' 7\" (1.702 m)   Wt 149 lb (67.6 kg)   SpO2 98%   BMI 23.34 kg/m²     Physical Examination:   Ortho Exam  Ms. Higginbotham physical exam see a pleasant 50-year-old woman, not impaired test.  Is full range of motion of the left elbow. There is still slight tenderness over that lateral brachialis when she contracts the muscles. Radiographs and Laboratory Studies:     Diagnostic Imaging Studies:    Reviewing the MRI on 12/28/2020, reveals that there is no injury noted of the brachialis muscle. Procedures Performed Today:     [1]    Assessment / Plan:      Diagnosis Orders   1. Rupture of left distal biceps tendon, subsequent encounter        No orders of the defined types were placed in this encounter. No orders of the defined types were placed in this encounter. Patient Instructions   Recommend that she continues to work hardening. Recommend with respect to the brachialis muscle to discontinue her ibuprofen, to continue physical therapy with deep friction massage, and in time this should resolve. 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 We will follow her on an as-needed basis. Return if symptoms worsen or fail to improve.     Cheryl Russell M.D., 5 [facility]  Orthopaedic Surgery

## 2021-07-28 ENCOUNTER — HOSPITAL ENCOUNTER (OUTPATIENT)
Dept: PHYSICAL THERAPY | Age: 51
Setting detail: THERAPIES SERIES
Discharge: HOME | End: 2021-07-28
Payer: COMMERCIAL

## 2021-07-28 PROCEDURE — 97140 MANUAL THERAPY 1/> REGIONS: CPT

## 2021-07-28 PROCEDURE — 97110 THERAPEUTIC EXERCISES: CPT

## 2021-07-28 NOTE — PROGRESS NOTES
[address] [facility]  Outpatient Physical Therapy    Treatment Note        Date: 2021  Patient: Neftaly Ramirez  : 1970  ACCT #: [de-identified]  Referring Practitioner: Gadiel Vines M.D. Diagnosis: Biceps rupture, distal, left, subsequent encounter  Treatment Diagnosis: S/P left distal bicep repair     Visit Information:  PT Visit Information  Onset Date: 21  PT Insurance Information: [facility]- claim #[id number]  Total # of Visits Approved: 16  Total # of Visits to Date:   Plan of Care/Certification Expiration Date: 21  No Show: 0  Canceled Appointment: 1  Progress Note Counter:     Subjective: Pt reports the MD suggested she stop taking all NSAIDs and continue w/ deep friction massage. Pt reports she's been sore since. Pt states the MD released her unless she absolutely needs to return. Pt RTW 21. Pt reports aixa lomax returned last night. Comments: New C-9 obtained with end date of 21  HEP Compliance:  [x] Good [] Fair [] Poor [] Reports not doing due to:    Vital Signs  Patient Currently in Pain: Denies   Pain Screening  Patient Currently in Pain: Denies    OBJECTIVE:   Exercises  Exercise 3: Chest Press with eccentric return 5 pl 2 x 10  Exercise 7: UBE level 3.8- 3 min forward and 3 minutes backwards.   Exercise 12: Tricep extension on Apollo machine 40# 2x10  (focus on eccentric return)  Exercise 13: Lat pull down on Apollo machine 40# 2x18  Exercise 14: Rows and straight arm pulldowns on Apollo Machine 40# 2x18 each  Exercise 20: catch light weight ball palm up and arm in nuetral with 1.5 kg ball x15-20; therapist standing on 8'' step and dropping 1kg 15-20x    Strength: [x] NT  [] MMT completed:    ROM: [x] NT  [] ROM measurements:     Manual:   Manual therapy  Soft Tissue Mobalization: MFD cuppung techniques to L lateral elbow w/ gliding technique first followed by 2 static cups along L brachialis x4 mins (10 mins total) pt educated on indication, contraindications, and potential side effects of cupping. Modalities:  Modalities  Moist heat: Moist heat x10 min to L elbow for post exertion pain control and mm relaxation     *Indicates exercise, modality, or manual techniques to be initiated when appropriate    Assessment: Body structures, Functions, Activity limitations: Decreased strength, Increased pain  Assessment: Initiated cupping techniques for decreased pain and tightness in lateral elbow for improved tolerance to RTW tasks. Pt w/o adverse reaction and no visible bruising present at end of session. Treatment Diagnosis: S/P left distal bicep repair  Prognosis: Excellent     Goals:  Short term goals  Time Frame for Short term goals: 3-7  Short term goal 1: Increase range of motion of the left shoulder to full motion all planes (goal met)  Short term goal 2: Increase left elbow supination 0-90 degrees (goal met )  Short term goal 3: Pain decreased with activity to 5/10  (goal met )  Short term goal 4: Increase  strength by 10 to 20 lbs per each measurement on the left (goal met)  Long term goals  Time Frame for Long term goals : 8-16  Long term goal 1: Left elbow full range of motion (goal met )  Long term goal 2: Strength of the left shoulder 5/5 (goal met )  Long term goal 3: Strength of left elbow 5/5  Long term goal 4: No further pain with use of the left elbow  Long term goal 5: Patient able to go back to full duty work  Progress toward goals: increase strength, decrease pain to RTW    POST-PAIN       Pain Rating (0-10 pain scale):  0 /10   Location and pain description same as pre-treatment unless indicated. Action: [] NA   [x] Perform HEP  [] Meds as prescribed  [] Modalities as prescribed   [] Call Physician     Frequency/Duration:  Plan  Times per week: 2  Plan weeks: 6-8  Current Treatment Recommendations: Strengthening, Pain Management     Pt to continue current HEP.   See objective section for any therapeutic exercise changes, additions or modifications this date.     PT Individual Minutes  Time In: 1030  Time Out: 1125  Minutes: 55  Timed Code Treatment Minutes: 45 Minutes  Procedure Minutes: 10     Modality Time In Time Out Total Minutes Units    Ther ex (56650) 1030 1105 35 2   Manual Therapy (31371) 1106 1114 10 1   MHP (73019) 1115 8874 10 0     Signature:  Electronically signed by Seferino Powell PTA on 7/28/21 at 10:29 AM EDT

## 2021-07-30 ENCOUNTER — HOSPITAL ENCOUNTER (OUTPATIENT)
Dept: PHYSICAL THERAPY | Age: 51
Setting detail: THERAPIES SERIES
Discharge: HOME | End: 2021-07-30
Payer: COMMERCIAL

## 2021-07-30 PROCEDURE — 97110 THERAPEUTIC EXERCISES: CPT

## 2021-07-30 PROCEDURE — 97140 MANUAL THERAPY 1/> REGIONS: CPT

## 2021-07-30 ASSESSMENT — PAIN DESCRIPTION - DESCRIPTORS: DESCRIPTORS: ACHING

## 2021-07-30 ASSESSMENT — PAIN DESCRIPTION - PAIN TYPE: TYPE: SURGICAL PAIN

## 2021-07-30 ASSESSMENT — PAIN DESCRIPTION - ORIENTATION: ORIENTATION: LEFT

## 2021-07-30 ASSESSMENT — PAIN DESCRIPTION - LOCATION: LOCATION: ELBOW

## 2021-07-30 ASSESSMENT — PAIN SCALES - GENERAL: PAINLEVEL_OUTOF10: 1

## 2021-07-30 NOTE — PROGRESS NOTES
[address] [facility]  Outpatient Physical Therapy    Treatment Note        Date: 2021  Patient: Kimani Mayfield  : 1970  ACCT #: [de-identified]  Referring Practitioner: Donavan Umana M.D. Diagnosis: Biceps rupture, distal, left, subsequent encounter  Treatment Diagnosis: S/P left distal bicep repair     Visit Information:  PT Visit Information  Onset Date: 21  PT Insurance Information: [facility]- claim #[id number]  Total # of Visits Approved: 16  Total # of Visits to Date:   Plan of Care/Certification Expiration Date: 21  No Show: 0  Canceled Appointment: 1  Progress Note Counter:     Subjective: Pt reports not sleeping well last night d/t the achiness in her elbow and not taking any NSAIDs. Pt reports the cupping felt \"warm longer than just using the hot pack. \" Pt reports getting a aixa horse yesterday. Comments: New C-9 obtained with end date of 21  HEP Compliance:  [x] Good [] Fair [] Poor [] Reports not doing due to:    Vital Signs  Patient Currently in Pain: Yes   Pain Screening  Patient Currently in Pain: Yes  Pain Assessment  Pain Assessment: 0-10  Pain Level: 1  Pain Type: Surgical pain  Pain Location: Elbow  Pain Orientation: Left  Pain Descriptors: Aching    OBJECTIVE:   Exercises  Exercise 1: lott ropes waves and jump rope 30''x3  Exercise 2: table in lowest position w/ inverted BOSU in plank position walking over and back x10  Exercise 3: Chest Press with eccentric return 5 pl 2 x 12  Exercise 6: farmers carry w/ 22# box elbows bent 30ftx2; 25# 30ftx2  Exercise 7: UBE level 3.8- 3 min forward and 3 minutes backwards.   Exercise 12: Tricep extension on Apollo machine 40# 2x10  (focus on eccentric return)  Exercise 13: Lat pull down on Apollo machine 40# 2x18  Exercise 14: Rows and straight arm pulldowns on Apollo Machine 40# 2x18 each  Exercise 20: catch light weight ball palm up and arm in nuetral with 8# ball x15-20; therapist standing on 8'' step and dropping 2.5kg 15-20x    Strength: [x] NT  [] MMT completed:     ROM: [x] NT  [] ROM measurements:     Manual:   Manual therapy  Soft Tissue Mobalization: MFD cuppung techniques to L lateral elbow w/ gliding technique first followed by 2 static cups along L brachialis x4 mins (10 mins total)    Modalities:  Modalities  Moist heat: Moist heat x10 min to L elbow for post exertion pain control and mm relaxation     *Indicates exercise, modality, or manual techniques to be initiated when appropriate    Assessment: Body structures, Functions, Activity limitations: Decreased strength, Increased pain  Assessment: Initiated lott ropes w/ slow controlled movement focusing on technique and decreased compensations w/ pt stating that those motions are very similar to what she will need to do upon RTW. Cont'd w/ cupping techniuqes for decreased pain and inflammation. Treatment Diagnosis: S/P left distal bicep repair  Prognosis: Excellent     Goals:  Short term goals  Time Frame for Short term goals: 3-7  Short term goal 1: Increase range of motion of the left shoulder to full motion all planes (goal met)  Short term goal 2: Increase left elbow supination 0-90 degrees (goal met )  Short term goal 3: Pain decreased with activity to 5/10  (goal met )  Short term goal 4: Increase  strength by 10 to 20 lbs per each measurement on the left (goal met)  Long term goals  Time Frame for Long term goals : 8-16  Long term goal 1: Left elbow full range of motion (goal met )  Long term goal 2: Strength of the left shoulder 5/5 (goal met )  Long term goal 3: Strength of left elbow 5/5  Long term goal 4: No further pain with use of the left elbow  Long term goal 5: Patient able to go back to full duty work  Progress toward goals: increase strength for RTW    POST-PAIN       Pain Rating (0-10 pain scale):   0/10   Location and pain description same as pre-treatment unless indicated.    Action: [] NA   [x] Perform HEP  [] Meds as prescribed  [] Modalities as prescribed   [] Call Physician     Frequency/Duration:  Plan  Times per week: 2  Plan weeks: 6-8  Current Treatment Recommendations: Strengthening, Pain Management     Pt to continue current HEP. See objective section for any therapeutic exercise changes, additions or modifications this date.     PT Individual Minutes  Time In: 0805  Time Out: 0908  Minutes: 63  Timed Code Treatment Minutes: 53 Minutes  Procedure Minutes: 10     Modality Time In Time Out Total Minutes Units    Ther ex (30787) 120 582 43 3   Manual Therapy (22488) 955 697 10 1   MHP (69377) 267 943 10 0     Signature:  Electronically signed by Azeb Cedeño PTA on 7/30/21 at 8:03 AM EDT

## 2021-08-04 ENCOUNTER — HOSPITAL ENCOUNTER (OUTPATIENT)
Dept: PHYSICAL THERAPY | Age: 51
Setting detail: THERAPIES SERIES
Discharge: HOME | End: 2021-08-04
Payer: COMMERCIAL

## 2021-08-04 PROCEDURE — 97140 MANUAL THERAPY 1/> REGIONS: CPT

## 2021-08-04 ASSESSMENT — PAIN DESCRIPTION - DESCRIPTORS: DESCRIPTORS: ACHING

## 2021-08-04 ASSESSMENT — PAIN DESCRIPTION - ORIENTATION: ORIENTATION: LEFT

## 2021-08-04 ASSESSMENT — PAIN DESCRIPTION - LOCATION: LOCATION: ELBOW

## 2021-08-04 ASSESSMENT — PAIN DESCRIPTION - PAIN TYPE: TYPE: SURGICAL PAIN

## 2021-08-04 ASSESSMENT — PAIN SCALES - GENERAL: PAINLEVEL_OUTOF10: 1

## 2021-08-04 NOTE — PROGRESS NOTES
*Indicates exercise, modality, or manual techniques to be initiated when appropriate    Assessment: Body structures, Functions, Activity limitations: Decreased strength, Increased pain  Assessment: Tx focus on manual techniques as pt reports increased soreness still from previous tx session 5 days ago. Pt cont's to demo significant tightness over L lateral elbow, though relief palpated w/ min tightness post-manual. Educated pt on using heat and ice alternating w/ tens unit for relief of soreness. Treatment Diagnosis: S/P left distal bicep repair  Prognosis: Excellent     Goals:  Short term goals  Time Frame for Short term goals: 3-7  Short term goal 1: Increase range of motion of the left shoulder to full motion all planes (goal met)  Short term goal 2: Increase left elbow supination 0-90 degrees (goal met )  Short term goal 3: Pain decreased with activity to 5/10  (goal met )  Short term goal 4: Increase  strength by 10 to 20 lbs per each measurement on the left (goal met)  Long term goals  Time Frame for Long term goals : 8-16  Long term goal 1: Left elbow full range of motion (goal met )  Long term goal 2: Strength of the left shoulder 5/5 (goal met )  Long term goal 3: Strength of left elbow 5/5  Long term goal 4: No further pain with use of the left elbow  Long term goal 5: Patient able to go back to full duty work  Progress toward goals: focus on decreasing muscle soreness    POST-PAIN       Pain Rating (0-10 pain scale):   0/10 \"it's looser\"  Location and pain description same as pre-treatment unless indicated. Action: [] NA   [x] Perform HEP  [] Meds as prescribed  [] Modalities as prescribed   [] Call Physician     Frequency/Duration:  Plan  Times per week: 2  Plan weeks: 6-8  Current Treatment Recommendations: Strengthening, Pain Management     Pt to continue current HEP. See objective section for any therapeutic exercise changes, additions or modifications this date.     PT Individual Minutes  Time In: 1100  Time Out: 1140  Minutes: 40  Timed Code Treatment Minutes: 30 Minutes  Procedure Minutes: 10     Modality Time In Time Out Total Minutes Units    Ther ex (62518) 1100 1105 5 0   Manual Therapy (06017) 1105 1130 25 2   MHP (39082) 1134 1140 10 0     Signature:  Electronically signed by Cindy Brewer PTA on 8/4/21 at 11:09 AM EDT

## 2021-08-06 ENCOUNTER — HOSPITAL ENCOUNTER (OUTPATIENT)
Dept: PHYSICAL THERAPY | Age: 51
Setting detail: THERAPIES SERIES
Discharge: HOME | End: 2021-08-06
Payer: COMMERCIAL

## 2021-08-06 ENCOUNTER — TELEPHONE (OUTPATIENT)
Dept: ORTHOPEDIC SURGERY | Age: 51
End: 2021-08-06

## 2021-08-06 PROCEDURE — 97140 MANUAL THERAPY 1/> REGIONS: CPT

## 2021-08-06 PROCEDURE — 97110 THERAPEUTIC EXERCISES: CPT

## 2021-08-06 ASSESSMENT — PAIN SCALES - GENERAL: PAINLEVEL_OUTOF10: 3

## 2021-08-06 ASSESSMENT — PAIN DESCRIPTION - DESCRIPTORS: DESCRIPTORS: TIGHTNESS;SORE

## 2021-08-06 ASSESSMENT — PAIN DESCRIPTION - ORIENTATION: ORIENTATION: LEFT

## 2021-08-06 ASSESSMENT — PAIN DESCRIPTION - LOCATION: LOCATION: ELBOW

## 2021-08-06 NOTE — PROGRESS NOTES
[address] [facility]  Outpatient Physical Therapy    Treatment Note        Date: 2021  Patient: Amrit Marino  : 1970  ACCT #: [de-identified]  Referring Practitioner: Kathy Greenberg M.D. Diagnosis: Biceps rupture, distal, left, subsequent encounter  Treatment Diagnosis: S/P left distal bicep repair     Visit Information:  PT Visit Information  Onset Date: 21  PT Insurance Information: [facility]- claim #[id number]  Total # of Visits Approved: 16  Total # of Visits to Date:   Plan of Care/Certification Expiration Date: 21  No Show: 0  Canceled Appointment: 1  Progress Note Counter:     Subjective: Pt reports 3/10 pain/\"tightness\" with elbow extension. Pt reports cont'd soreness and \"taking it easy\" w/ HEP/gym routine this week. Comments: New C-9 obtained with end date of 21  HEP Compliance:  [x] Good [] Fair [] Poor [] Reports not doing due to:    Vital Signs  Patient Currently in Pain: Yes   Pain Screening  Patient Currently in Pain: Yes  Pain Assessment  Pain Assessment: 0-10  Pain Level: 3  Pain Location: Elbow  Pain Orientation: Left  Pain Descriptors: Tightness; Sore    OBJECTIVE:   Exercises  Exercise 7: UBE level 3.8- 3 min forward and 3 minutes backwards.   Exercise 15: bicep stretch at ladder 30''x5  Exercise 16: lat pull stretch at ladder 30''x5    Strength: [x] NT  [] MMT completed:    ROM: [x] NT  [] ROM measurements:     Manual:   Manual therapy  Soft Tissue Mobalization: manual STM, cross friction massage, and deep tissue release performed to L lateral elbow x10 mins  Other: IDN to further improve NM mechanics, tissue mobility, and decrease pain (please refer to homeostatic paper for specific points needled to be scanned into chart at D/C) x5 mins performed by Araceli Lundberg, PT, DPT- Musculocutaneous nerve distribution NV*    Modalities:  Modalities  Moist heat: Moist heat x10 min to L elbow for post exertion pain control and mm relaxation     *Indicates exercise, modality, or manual techniques to be initiated when appropriate    Assessment: Body structures, Functions, Activity limitations: Decreased strength, Increased pain  Assessment: Continue to focus on anti-inflammatory techniques such as stretching, STM, and initiation of IDN performed by Myrna Dunn PT, DPT. Overall pt's lateral elbow felt that it had less tissue restriction than last visit, but still present. Will continue to progress as tolerated. Treatment Diagnosis: S/P left distal bicep repair  Prognosis: Excellent     Goals:  Short term goals  Time Frame for Short term goals: 3-7  Short term goal 1: Increase range of motion of the left shoulder to full motion all planes (goal met)  Short term goal 2: Increase left elbow supination 0-90 degrees (goal met )  Short term goal 3: Pain decreased with activity to 5/10  (goal met )  Short term goal 4: Increase  strength by 10 to 20 lbs per each measurement on the left (goal met)  Long term goals  Time Frame for Long term goals : 8-16  Long term goal 1: Left elbow full range of motion (goal met )  Long term goal 2: Strength of the left shoulder 5/5 (goal met )  Long term goal 3: Strength of left elbow 5/5  Long term goal 4: No further pain with use of the left elbow  Long term goal 5: Patient able to go back to full duty work  Progress toward goals: focus on decreasing pain and tightness    POST-PAIN       Pain Rating (0-10 pain scale):   0/10   Location and pain description same as pre-treatment unless indicated. Action: [] NA   [x] Perform HEP  [] Meds as prescribed  [] Modalities as prescribed   [] Call Physician     Frequency/Duration:  Plan  Times per week: 2  Plan weeks: 6-8  Current Treatment Recommendations: Strengthening, Pain Management, Integrated Dry Needling  Plan Comment: Added Integrated Dry Needling to POC 8/6/2021 by Bonny Chavez, PT to be performed by Myrna Dunn, PT, DPT     Pt to continue current HEP.   See objective section for any therapeutic exercise changes, additions or modifications this date.     PT Individual Minutes  Time In: 2883  Time Out: 2535  Minutes: 53  Timed Code Treatment Minutes: 43 Minutes  Procedure Minutes: 10 MHP      Modality Time In Time Out Total Minutes Units    Ther ex (77655) 340 632 27 2   Manual Therapy (73 500 446  126 3438  290 12 1   KBD (404 [id number]:  Electronically signed by Bishnu Villegas, PTA on 8/6/21 at 9:16 AM EDT Electronically signed by Katherine Mendoza, PT on 8/6/2021 at 10:16 AM, Electronically signed by Marcus Arceo, PT on 8/6/2021 at 2:44 PM

## 2021-08-11 ENCOUNTER — HOSPITAL ENCOUNTER (OUTPATIENT)
Dept: PHYSICAL THERAPY | Age: 51
Setting detail: THERAPIES SERIES
Discharge: HOME | End: 2021-08-11
Payer: COMMERCIAL

## 2021-08-11 PROCEDURE — 97110 THERAPEUTIC EXERCISES: CPT

## 2021-08-11 PROCEDURE — 97140 MANUAL THERAPY 1/> REGIONS: CPT

## 2021-08-11 ASSESSMENT — PAIN DESCRIPTION - DESCRIPTORS: DESCRIPTORS: ACHING

## 2021-08-11 ASSESSMENT — PAIN SCALES - GENERAL: PAINLEVEL_OUTOF10: 1

## 2021-08-11 ASSESSMENT — PAIN DESCRIPTION - ORIENTATION: ORIENTATION: LEFT

## 2021-08-11 ASSESSMENT — PAIN DESCRIPTION - LOCATION: LOCATION: ELBOW

## 2021-08-11 NOTE — PROGRESS NOTES
[address] [facility]  Outpatient Physical Therapy    Treatment Note        Date: 2021  Patient: Lindy London  : 1970  ACCT #: [de-identified]  Referring Practitioner: Rena Dunham M.D. Diagnosis: Biceps rupture, distal, left, subsequent encounter  Treatment Diagnosis: S/P left distal bicep repair     Visit Information:  PT Visit Information  Onset Date: 21  PT Insurance Information: [facility]- claim #[id number]  Total # of Visits Approved: 16  Total # of Visits to Date:   Plan of Care/Certification Expiration Date: 21  No Show: 0  Canceled Appointment: 1  Progress Note Counter: 15/16    Subjective: Pt reports feeling some relief on , improved pain from last week. Arm is a little \"achy\" currrently 1/10. Comments: New C-9 obtained with end date of 21; RTW 2021  HEP Compliance:  [x] Good [] Fair [] Poor [] Reports not doing due to:    Vital Signs  Patient Currently in Pain: Yes   Pain Screening  Patient Currently in Pain: Yes  Pain Assessment  Pain Assessment: 0-10  Pain Level: 1  Pain Location: Elbow  Pain Orientation: Left  Pain Descriptors: Aching    OBJECTIVE:   Exercises  Exercise 7: UBE level 3.8- 4 min forward and 4 minutes backwards.   Exercise 15: bicep stretch at ladder 30''x5  Exercise 16: lat pull stretch at ladder 30''x5  Exercise 19: objective measures 5 mins    Strength: [] NT  [x] MMT completed:  Strength LUE  L Shoulder Flexion: 5/5  L Shoulder Extension: 5/5  L Shoulder ABduction: 5/5  L Shoulder Internal Rotation: 5/5  L Shoulder External Rotation: 5/5  L Elbow Flexion: 4/5 (increased pain)  L Elbow Extension: 5/5  L Forearm Pron: 5/5  L Forearm Sup: 5/5     ROM: [] NT  [x] ROM measurements:  AROM LUE (degrees)  L Elbow Flexion 0-145: 0-150  L Elbow Extension 145-0: 0     Manual:   Manual therapy  Soft Tissue Mobalization: manual STM, cross friction massage, and deep tissue release performed to L lateral elbow  Other: IDN to further improve NM mechanics, tissue mobility, and decrease pain (please refer to homeostatic paper for specific points needled to be scanned into chart at D/C) x6 mins performed by Howard Connor, PT, DPT    Modalities:  Modalities  Moist heat: Moist heat x10 min to L elbow for post exertion pain control and mm relaxation     *Indicates exercise, modality, or manual techniques to be initiated when appropriate    Assessment: Body structures, Functions, Activity limitations: Decreased strength, Increased pain  Assessment: Pt continues to report pain w/ weighted bicep curls and for the last ~10 days. pt reports having difficulty getting full elbow extension w/ significant tightness, though as of Sunday has started to decrease. Recently initiated IDN performed by Howard Connor PT, DPT w/ focus on stretching and soft tissue massage to decrease inflammation and tightness at L elbow w/ gradual improvements reported by pt. Treatment Diagnosis: S/P left distal bicep repair  Prognosis: Excellent  Patient Education: elbow anatomy    Goals:  Short term goals  Time Frame for Short term goals: 3-7  Short term goal 1: Increase range of motion of the left shoulder to full motion all planes (goal met)  Short term goal 2: Increase left elbow supination 0-90 degrees (goal met )  Short term goal 3: Pain decreased with activity to 5/10  (goal met )  Short term goal 4:  Increase  strength by 10 to 20 lbs per each measurement on the left (goal met)  Long term goals  Time Frame for Long term goals : 8-16  Long term goal 1: Left elbow full range of motion (goal met )  Long term goal 2: Strength of the left shoulder 5/5 (goal met)  Long term goal 3: Strength of left elbow 5/5  Long term goal 4: No further pain with use of the left elbow  Long term goal 5: Patient able to go back to full duty work including lifitng, carrying, catching, and ballistic movements without c/o pain  Progress toward goals: see PN    POST-PAIN       Pain Rating (0-10 pain scale): 0/10   Location and pain description same as pre-treatment unless indicated. Action: [] NA   [x] Perform HEP  [] Meds as prescribed  [] Modalities as prescribed   [] Call Physician     Frequency/Duration:  Plan  Times per week: 2  Plan weeks: 6-8  Current Treatment Recommendations: Strengthening, Pain Management, Integrated Dry Needling  Plan Comment: PN completed today requesting further visits. MD office contacted for new C-9 on Friday 8/6/2021. Pt to continue current HEP. See objective section for any therapeutic exercise changes, additions or modifications this date.     PT Individual Minutes  Time In: 1001  Time Out: 1058  Minutes: 57  Timed Code Treatment Minutes: 47 Minutes  Procedure Minutes: 10 MHP     Modality Time In Time Out Total Minutes Units    Ther ex (74847) 2864 4168 53 1   Manual Therapy ([id number]   2   MHP (64952) 0254 2233 10 1     Signature:  Electronically signed by Katherine Tony PTA on 8/11/21 at 8:49 AM EDT Electronically signed by Araceli Lundberg PT on 8/11/2021 at 12:00 PM

## 2021-08-11 NOTE — PROGRESS NOTES
[address]  PHYSICAL THERAPY PLAN OF CARE   [address], [address] [facility]         Ph: [phone]  Fax: [phone]    [] Certification  [] Recertification []  Plan of Care  [x] Progress Note [] Discharge      To:  Christi Martinez M.D. From:  Candy Escalera PT, DPT  Patient: Marylee Lob     : 1970  Diagnosis: Biceps rupture, distal, left, subsequent encounter     Date: 2021  Treatment Diagnosis: S/P left distal bicep repair    Plan of Care/Certification Expiration Date: 21  Progress Report Period from:  21  to 2021    Total # of Visits to Date: 24   No Show: 0    Canceled Appointment: 1     OBJECTIVE:   Long Term Goals - Time Frame for Long term goals : 8-16  Goals Current/ Discharge status Met   Long term goal 1: Left elbow full range of motion (goal met ) AROM LUE (degrees)  L Elbow Flexion 0-145: 0-150  L Elbow Extension 145-0: 0 [x] yes  [] no   Long term goal 2: Strength of the left shoulder 5/5 (goal met) Strength LUE  L Shoulder Flexion: 5/5  L Shoulder Extension: 5/5  L Shoulder ABduction: 5/5  L Shoulder Internal Rotation: 5/5  L Shoulder External Rotation: 5/5 [x] yes  [] no   Long term goal 3: Strength of left elbow 5/5 L Elbow Flexion: 4/5 (increased pain)  L Elbow Extension: 5/5  L Forearm Pron: 5/5  L Forearm Sup: 5/5 [x] yes  [x] no   Long term goal 4: No further pain with use of the left elbow Pt reports continued pain w/ weighted bicep curls  [] yes  [x] no   Long term goal 5: Patient able to go back to full duty work including lifitng, carrying, catching, and ballistic movements without c/o pain Pt returns to work at temporary date of 2021 [] yes  [x] no      Body structures, Functions, Activity limitations: Decreased strength, Increased pain  Assessment: Pt with good progress toward above goals with overall normal L shoulder and elbow AROM and strength. However, pt continues to have elbow flexion weakness limited by pain.  Pt

## 2021-08-13 ENCOUNTER — HOSPITAL ENCOUNTER (OUTPATIENT)
Dept: PHYSICAL THERAPY | Age: 51
Setting detail: THERAPIES SERIES
Discharge: HOME | End: 2021-08-13
Payer: COMMERCIAL

## 2021-08-13 PROCEDURE — 97110 THERAPEUTIC EXERCISES: CPT

## 2021-08-13 PROCEDURE — 97140 MANUAL THERAPY 1/> REGIONS: CPT

## 2021-08-13 ASSESSMENT — PAIN DESCRIPTION - LOCATION: LOCATION: ELBOW

## 2021-08-13 ASSESSMENT — PAIN DESCRIPTION - ORIENTATION: ORIENTATION: LEFT

## 2021-08-13 ASSESSMENT — PAIN DESCRIPTION - DESCRIPTORS: DESCRIPTORS: ACHING

## 2021-08-13 ASSESSMENT — PAIN SCALES - GENERAL: PAINLEVEL_OUTOF10: 1

## 2021-08-13 ASSESSMENT — PAIN DESCRIPTION - PAIN TYPE: TYPE: SURGICAL PAIN

## 2021-08-13 NOTE — PROGRESS NOTES
post exertion pain control and mm relaxation     *Indicates exercise, modality, or manual techniques to be initiated when appropriate    Assessment: Body structures, Functions, Activity limitations: Decreased strength, Increased pain  Assessment: Cont'd focus on decreasing pain, inflammation, and muscle tightness at L elbow. Pt has moderate tightness at lateral-mid humerus w/ minimal improvement post-manual, though noticable improvement at bicep and bracialradialis. Pt additionally reporting decreased \"prickliness\" post-tx. Treatment Diagnosis: S/P left distal bicep repair  Prognosis: Excellent     Goals:  Short term goals  Time Frame for Short term goals: 3-7  Short term goal 1: Increase range of motion of the left shoulder to full motion all planes (goal met)  Short term goal 2: Increase left elbow supination 0-90 degrees (goal met )  Short term goal 3: Pain decreased with activity to 5/10  (goal met )  Short term goal 4: Increase  strength by 10 to 20 lbs per each measurement on the left (goal met)  Long term goals  Time Frame for Long term goals : 8-16  Long term goal 1: Left elbow full range of motion (goal met )  Long term goal 2: Strength of the left shoulder 5/5 (goal met)  Long term goal 3: Strength of left elbow 5/5  Long term goal 4: No further pain with use of the left elbow  Long term goal 5: Patient able to go back to full duty work including lifitng, carrying, catching, and ballistic movements without c/o pain  Progress toward goals: focus on decreasing pain and inflammation for improved tolerance to return to work    POST-PAIN       Pain Rating (0-10 pain scale):   0/10   Location and pain description same as pre-treatment unless indicated.    Action: [] NA   [x] Perform HEP  [] Meds as prescribed  [] Modalities as prescribed   [] Call Physician     Frequency/Duration:  Plan  Times per week: 2  Plan weeks: 6-8  Current Treatment Recommendations: Strengthening, Pain Management, Integrated Dry Needling  Plan Comment: MD office contacted for new C-9 on Friday 8/6/2021. Pt to continue current HEP. See objective section for any therapeutic exercise changes, additions or modifications this date.     PT Individual Minutes  Time In: 0803  Time Out: 8899  Minutes: 48  Timed Code Treatment Minutes: 38 Minutes  Procedure Minutes: 10     Modality Time In Time Out Total Minutes Units    Ther ex (08018) 228 440 29 2   Manual Therapy ((10) 6219-5849 10 1   P (12759) 364 804 10 0     Signature:  Electronically signed by Adams Berry PTA on 8/13/21 at 7:49 AM EDT

## 2021-08-18 ENCOUNTER — HOSPITAL ENCOUNTER (OUTPATIENT)
Dept: PHYSICAL THERAPY | Age: 51
Setting detail: THERAPIES SERIES
End: 2021-08-18
Payer: COMMERCIAL

## 2021-08-18 ENCOUNTER — TELEPHONE (OUTPATIENT)
Dept: ORTHOPEDIC SURGERY | Age: 51
End: 2021-08-18

## 2021-08-18 NOTE — TELEPHONE ENCOUNTER
Patient is calling to request the C-9 form to be extended because PT Dept called her about it. Please advise and thanks!

## 2021-08-23 ENCOUNTER — TELEPHONE (OUTPATIENT)
Dept: ORTHOPEDIC SURGERY | Age: 51
End: 2021-08-23

## 2021-08-23 NOTE — TELEPHONE ENCOUNTER
Incoming Call    Caller:  Name:  Mary Poll: [facility]  Title:  Nurse  Other Info: T. [phone]    F. [phone]    Communication (HIPPA):  HIPPA not applicable    Notes:  Prabha is calling from [address] [place] [address]. She is calling to advise that the patient's PT has now  and last office note recommended continuing care. Patient returns back to work on 2021. Prabha is asking if Provider would like to extend the PT for another 8 weeks. If yes please fax a new order and a C9 form.  TY    Please Advise

## 2021-08-25 NOTE — TELEPHONE ENCOUNTER
C-9 was completed to the best of my knowledge and faxed to this patient's [facility]. We are waiting on an approval. This will be faxed and patient will be notified once we receive this.

## 2021-09-02 ENCOUNTER — HOSPITAL ENCOUNTER (OUTPATIENT)
Dept: PHYSICAL THERAPY | Age: 51
Setting detail: THERAPIES SERIES
Discharge: HOME | End: 2021-09-02
Payer: COMMERCIAL

## 2021-09-02 PROCEDURE — 97110 THERAPEUTIC EXERCISES: CPT | Performed by: PHYSICAL THERAPIST

## 2021-09-02 PROCEDURE — 97124 MASSAGE THERAPY: CPT | Performed by: PHYSICAL THERAPIST

## 2021-09-02 ASSESSMENT — PAIN - FUNCTIONAL ASSESSMENT: PAIN_FUNCTIONAL_ASSESSMENT: PREVENTS OR INTERFERES SOME ACTIVE ACTIVITIES AND ADLS

## 2021-09-02 ASSESSMENT — PAIN DESCRIPTION - ONSET: ONSET: ON-GOING

## 2021-09-02 ASSESSMENT — PAIN DESCRIPTION - PAIN TYPE: TYPE: SURGICAL PAIN

## 2021-09-02 ASSESSMENT — PAIN DESCRIPTION - FREQUENCY: FREQUENCY: INTERMITTENT

## 2021-09-02 ASSESSMENT — PAIN DESCRIPTION - DESCRIPTORS: DESCRIPTORS: ACHING

## 2021-09-02 ASSESSMENT — PAIN SCALES - GENERAL: PAINLEVEL_OUTOF10: 1

## 2021-09-02 ASSESSMENT — PAIN DESCRIPTION - ORIENTATION: ORIENTATION: LEFT

## 2021-09-02 ASSESSMENT — PAIN DESCRIPTION - LOCATION: LOCATION: ELBOW

## 2021-09-02 ASSESSMENT — PAIN DESCRIPTION - PROGRESSION: CLINICAL_PROGRESSION: GRADUALLY IMPROVING

## 2021-09-02 NOTE — PROGRESS NOTES
[address] [facility]  Outpatient Physical Therapy    Treatment Note        Date: 2021  Patient: Ky Crabtree  : 1970  ACCT #: [de-identified]  Referring Practitioner: Katherine Euceda M.D. Diagnosis: Biceps rupture, distal, left, subsequent encounter  Treatment Diagnosis: S/P left distal bicep repair     Visit Information:  PT Visit Information  Onset Date: 21  PT Insurance Information: [facility]- claim #[id number]  Total # of Visits Approved: 12  Total # of Visits to Date: 32  Plan of Care/Certification Expiration Date: 10/08/21  Progress Note Counter:     Subjective: Patient reports that the cupping helped the most.  She will be returning to work in 1 1/2 weeks with a lifting restriction (no teenagers) small children only  Comments: New C-9 received and a end date of 10/8/21  HEP Compliance:  [x] Good [] Fair [] Poor [] Reports not doing due to:    Vital Signs  Patient Currently in Pain: Yes   Pain Screening  Patient Currently in Pain: Yes  Pain Assessment  Pain Assessment: 0-10  Pain Level: 1  Patient's Stated Pain Goal: No pain  Pain Type: Surgical pain  Pain Location: Elbow  Pain Orientation: Left  Pain Descriptors: Aching  Pain Frequency: Intermittent  Pain Onset: On-going  Clinical Progression: Gradually improving  Functional Pain Assessment: Prevents or interferes some active activities and ADLs    OBJECTIVE:   Exercises  Exercise 3: Chest Press with eccentric return 5 pl 2 x 12  Exercise 4: Standing elbow flexion 2 way (thumb up, plam down) 5# x15 ea. Exercise 7: UBE level 4.0- 4 min forward and 4 minutes backwards  Exercise 9: weighted hammer supination, pronation 2.5# elbow flexed x10, no added weight elbow ext x10  Exercise 10: body blade elbow flex 30''x2, elbow ext 20''x2  Exercise 11: Resisted exerciser for supination. 10 reps  Exercise 12: Tricep extension on Apollo machine 40# 2x10  (focus on eccentric return)  Exercise 13: Lat pull down on Apollo machine 40# 2x18  Exercise 14: Rows and straight arm pulldowns on Apollo Machine 40# 2x18 each  Exercise 15: bicep stretch at ladder 30''x5  Exercise 16: lat pull stretch at ladder 30''x5  Exercise 17: corner stretch at door 30''x3  Exercise 18: bent over reverse fly with 4# weight x15  Exercise 20: catch light weight ball palm up and arm in nuetral with 4# ball x15-20; therapist standing on 8'' step and dropping 2.5kg 15-20x, throwing 3# ball underhand 10 times       ROM: [] NT  [x] ROM measurements:         AROM RUE (degrees)  RUE AROM : WNL     AROM LUE (degrees)  LUE AROM : WNL     Manual:   Manual therapy  Soft Tissue Mobalization: manual STM, cross friction massage, and deep tissue release performed to the lateral bicep area    Modalities:  Modalities  Cryotherapy (Minutes\Location): Ice pack to the L elbow after exercise x10 mins     *Indicates exercise, modality, or manual techniques to be initiated when appropriate    Assessment:   Activity Tolerance  Activity Tolerance: Patient Tolerated treatment well    Body structures, Functions, Activity limitations: Decreased strength, Increased pain  Assessment: Patient continues to progress well. She has normal range of motion of the left elbow and shoulder. Her strength is improving. What is needed is continued ballistic type movements to condition her to return to her job. Treatment Diagnosis: S/P left distal bicep repair  Prognosis: Excellent  PT Education: Home Exercise Program    Goals:  Short term goals  Short term goal 1: Increase range of motion of the left shoulder to full motion all planes (goal met)  Short term goal 2: Increase left elbow supination 0-90 degrees (goal met )  Short term goal 3: Pain decreased with activity to 5/10  (goal met )  Short term goal 4:  Increase  strength by 10 to 20 lbs per each measurement on the left (goal met)    Long term goals  Time Frame for Long term goals : 8-16  Long term goal 1: Left elbow full range of motion (goal met )  Long term goal 2: Strength of the left shoulder 5/5 (goal met)  Long term goal 3: Strength of left elbow 5/5  Long term goal 4: No further pain with use of the left elbow  Long term goal 5: Patient able to go back to full duty work including lifitng, carrying, catching, and ballistic movements without c/o pain  Progress toward goals: Good    POST-PAIN       Pain Rating (0-10 pain scale):  1 /10   Location and pain description same as pre-treatment unless indicated. Action: [] NA   [x] Perform HEP  [] Meds as prescribed  [] Modalities as prescribed   [] Call Physician     Frequency/Duration:  Plan  Plan weeks: 6-8  Current Treatment Recommendations: Strengthening, Pain Management, Integrated Dry Needling (work simulation activity)     Pt to continue current HEP. See objective section for any therapeutic exercise changes, additions or modifications this date.          PT Individual Minutes  Time In: 0900  Time Out: 1000  Minutes: 60  Timed Code Treatment Minutes: 50 Minutes  Procedure Minutes: cold pack 10 minutes     Modality Time In Time Out Total Minutes Units    Ther ex (19822) 1010 1050 40 3   Manual Therapy (27580)       Neuro re-ed (77583)       Massage (40141) 1000 1010 10 1   Estim unattended   (52833)         Signature:  Electronically signed by Zaid Eisenberg PT on 9/2/21 at 11:28 AM EDT

## 2021-09-09 ENCOUNTER — HOSPITAL ENCOUNTER (OUTPATIENT)
Dept: PHYSICAL THERAPY | Age: 51
Setting detail: THERAPIES SERIES
Discharge: HOME | End: 2021-09-09
Payer: COMMERCIAL

## 2021-09-09 PROCEDURE — 97124 MASSAGE THERAPY: CPT | Performed by: PHYSICAL THERAPIST

## 2021-09-09 PROCEDURE — 97110 THERAPEUTIC EXERCISES: CPT | Performed by: PHYSICAL THERAPIST

## 2021-09-09 ASSESSMENT — PAIN DESCRIPTION - DESCRIPTORS: DESCRIPTORS: ACHING

## 2021-09-09 ASSESSMENT — PAIN DESCRIPTION - PROGRESSION: CLINICAL_PROGRESSION: GRADUALLY IMPROVING

## 2021-09-09 ASSESSMENT — PAIN SCALES - GENERAL: PAINLEVEL_OUTOF10: 1

## 2021-09-09 ASSESSMENT — PAIN DESCRIPTION - LOCATION: LOCATION: ELBOW

## 2021-09-09 ASSESSMENT — PAIN - FUNCTIONAL ASSESSMENT: PAIN_FUNCTIONAL_ASSESSMENT: PREVENTS OR INTERFERES SOME ACTIVE ACTIVITIES AND ADLS

## 2021-09-09 ASSESSMENT — PAIN DESCRIPTION - FREQUENCY: FREQUENCY: INTERMITTENT

## 2021-09-09 ASSESSMENT — PAIN DESCRIPTION - ORIENTATION: ORIENTATION: LEFT

## 2021-09-09 ASSESSMENT — PAIN DESCRIPTION - PAIN TYPE: TYPE: SURGICAL PAIN

## 2021-09-09 NOTE — PROGRESS NOTES
[address] [facility]  Outpatient Physical Therapy    Treatment Note        Date: 2021  Patient: Savita Anderson  : 1970  ACCT #: [de-identified]  Referring Practitioner: Lizzy Alfaro M.D. Diagnosis: Biceps rupture, distal, left, subsequent encounter  Treatment Diagnosis: S/P left distal bicep repair     Visit Information:  PT Visit Information  Onset Date: 21  PT Insurance Information: [facility]- claim #[id number]  Total # of Visits Approved: 12  Total # of Visits to Date: 32  Plan of Care/Certification Expiration Date: 10/08/21  No Show: 0  Canceled Appointment: 0  Progress Note Counter:     Subjective: Patient states her elbow is mostly achy today. She will be returning to work next week. Comments: New C-9 received and a end date of 10/8/21  HEP Compliance:  [x] Good [] Fair [] Poor [] Reports not doing due to:    Vital Signs  Patient Currently in Pain: Yes   Pain Screening  Patient Currently in Pain: Yes  Pain Assessment  Pain Assessment: 0-10  Pain Level: 1  Patient's Stated Pain Goal: No pain  Pain Type: Surgical pain  Pain Location: Elbow  Pain Orientation: Left  Pain Descriptors: Aching  Pain Frequency: Intermittent  Clinical Progression: Gradually improving  Functional Pain Assessment: Prevents or interferes some active activities and ADLs    OBJECTIVE:   Exercises  Exercise 3: Chest Press with eccentric return 5 pl 2 x 12  Exercise 4: Standing elbow flexion 2 way (thumb up, plam down) 5# x15 ea. Exercise 7: UBE level 4.0- 4 min forward and 4 minutes backwards  Exercise 8: Elbow extension/flexion with shoulder at 90 degrees 10 reps.   Exercise 9: weighted hammer supination, pronation 2.5# elbow flexed x10, no added weight elbow ext x10  Exercise 12: Tricep extension on Apollo machine 40# 2x10  (focus on eccentric return)  Exercise 13: Lat pull down on Apollo machine 40# 2x18  Exercise 14: Rows and straight arm pulldowns on Apollo Machine 40# 2x18 each  Exercise 15: bicep stretch at ladder 30''x5  Exercise 16: lat pull stretch at ladder 30''x5  Exercise 17: corner stretch at door 30''x3  Exercise 20: catch light weight ball palm up and arm in nuetral with 4# ball x15-20; therapist standing on 8'' step and dropping 2.5kg 15-20x, throwing 3# ball underhand 10 times          Strength: [] NT  [x] MMT completed:      Strength LUE  L Shoulder Flexion: 5/5  L Shoulder Extension: 5/5  L Shoulder ABduction: 5/5  L Shoulder Internal Rotation: 5/5  L Shoulder External Rotation: 5/5  L Elbow Flexion: 5/5  L Elbow Extension: 5/5  L Forearm Pron: 5/5  L Forearm Sup: 5/5       ROM: [] NT  [x] ROM measurements:         AROM LUE (degrees)  L Shoulder Flexion 0-180: 0-180  L Shoulder Extension 0-45: 0-45  L Shoulder Int Rotation  0-70: 0-70  L Shoulder Ext Rotation  0-90: 0-90  L Elbow Extension 145-0: 145  L Forearm Pron 0-90: 0-90  L Forearm Supination  0-90: 0-90  L Wrist Flexion 0-80: 0-80  L Wrist Radial Deviation 0-20: 0-20  L Wrist Ulnar Deviation 0-45: 0-45     Manual:   Manual therapy  Soft Tissue Mobalization: manual STM, cross friction massage, and deep tissue release performed to the lateral bicep area    Modalities:  Modalities  Moist heat: Moist heat x10 min to L elbow for post exertion pain control and mm relaxation  Cryotherapy (Minutes\Location): Ice pack to the L elbow after exercise x10 mins     *Indicates exercise, modality, or manual techniques to be initiated when appropriate    Assessment:   Activity Tolerance  Activity Tolerance: Patient Tolerated treatment well  Activity Tolerance: Patient tolerating all exercises well. Body structures, Functions, Activity limitations: Decreased strength, Increased pain  Assessment: Patient continues to progress well. She will be returning to work next week. She will be engaged in more ballistic movements when she returns to work. She still has some remaining therapy after she returns to assist her in integrating back to work.   Treatment Diagnosis: S/P left distal bicep repair  Prognosis: Excellent  PT Education: Home Exercise Program    Goals:  Short term goals  Time Frame for Short term goals: 3-7  Short term goal 1: Increase range of motion of the left shoulder to full motion all planes (goal met)  Short term goal 2: Increase left elbow supination 0-90 degrees (goal met )  Short term goal 3: Pain decreased with activity to 5/10  (goal met )  Short term goal 4: Increase  strength by 10 to 20 lbs per each measurement on the left (goal met)    Long term goals  Time Frame for Long term goals : 8-16  Long term goal 1: Left elbow full range of motion (goal met )  Long term goal 2: Strength of the left shoulder 5/5 (goal met)  Long term goal 3: Strength of left elbow 5/5 (goal met)  Long term goal 4: No further pain with use of the left elbow  Long term goal 5: Patient able to go back to full duty work including lifitng, carrying, catching, and ballistic movements without c/o pain  Progress toward goals:good  POST-PAIN       Pain Rating (0-10 pain scale): 1  /10   Location and pain description same as pre-treatment unless indicated. Action: [] NA   [x] Perform HEP  [] Meds as prescribed  [] Modalities as prescribed   [] Call Physician     Frequency/Duration:  Plan  Times per week: 2  Current Treatment Recommendations: Strengthening, Pain Management, Integrated Dry Needling     Pt to continue current HEP. See objective section for any therapeutic exercise changes, additions or modifications this date.          PT Individual Minutes  Time In: 0900  Time Out: 1010  Minutes: 70  Timed Code Treatment Minutes: 50 Minutes  Procedure Minutes: Moist heat 10 minutes, cold pack 10 minutes     Modality Time In Time Out Total Minutes Units    Ther ex (72719) 3819 9293 30 2   Manual Therapy (19816)       Neuro re-ed (74175)       Massage (86684) 0946 0920 10 1   Estim unattended   (37647)         Signature:  Electronically signed by Eleuterio Avila PT on 9/9/21 at 10:03 AM EDT

## 2021-09-14 ENCOUNTER — HOSPITAL ENCOUNTER (OUTPATIENT)
Dept: PHYSICAL THERAPY | Age: 51
Setting detail: THERAPIES SERIES
Discharge: HOME | End: 2021-09-14
Payer: COMMERCIAL

## 2021-09-14 PROCEDURE — 97140 MANUAL THERAPY 1/> REGIONS: CPT

## 2021-09-14 PROCEDURE — 97110 THERAPEUTIC EXERCISES: CPT

## 2021-09-14 ASSESSMENT — PAIN DESCRIPTION - DESCRIPTORS: DESCRIPTORS: ACHING

## 2021-09-14 ASSESSMENT — PAIN SCALES - GENERAL: PAINLEVEL_OUTOF10: 2

## 2021-09-14 ASSESSMENT — PAIN DESCRIPTION - PAIN TYPE: TYPE: SURGICAL PAIN

## 2021-09-14 ASSESSMENT — PAIN DESCRIPTION - LOCATION: LOCATION: ELBOW

## 2021-09-14 ASSESSMENT — PAIN DESCRIPTION - ORIENTATION: ORIENTATION: LEFT

## 2021-09-14 NOTE — PROGRESS NOTES
[address] [facility]  Outpatient Physical Therapy    Treatment Note        Date: 2021  Patient: Savita Anderson  : 1970  ACCT #: [de-identified]  Referring Practitioner: Lizzy Alfaro M.D. 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 Pt states she got the longest relief from cupping vs IDN previously. Comments: New C-9 received and a end date of 10/8/21  HEP Compliance:  [x] Good [] Fair [] Poor [] Reports not doing due to:    Vital Signs  Patient Currently in Pain: Yes   Pain Screening  Patient Currently in Pain: Yes  Pain Assessment  Pain Assessment: 0-10  Pain Level: 2  Pain Type: Surgical pain  Pain Location: Elbow  Pain Orientation: Left  Pain Descriptors: Aching    OBJECTIVE:   Exercises  Exercise 3: Chest Press with eccentric return 5 pl 2 x 12  Exercise 4: Standing elbow flexion 2 way (thumb up, plam down) 5# x15 ea. Exercise 7: UBE level 4.3- 4 min forward and 4 minutes backwards  Exercise 8: Elbow extension/flexion with shoulder at 90 degrees 10 reps.   Exercise 9: weighted hammer supination, pronation 2.5# elbow flexed x10, no added weight elbow ext x10  Exercise 12: Tricep extension on Apollo machine 40# 2x10  (focus on eccentric return)  Exercise 13: Lat pull down on Apollo machine 40# 2x18  Exercise 14: Rows and straight arm pulldowns on Apollo Machine 40# 2x18 each  Exercise 15: bicep stretch at ladder 30''x5  Exercise 16: lat pull stretch at ladder 30''x5  Exercise 17: corner stretch at door 30''x3  Exercise 20: catch light weight ball palm up and arm in nuetral with 4# ball x15-20; therapist standing on 8'' step and dropping 2.5kg 15-20x, throwing 3# ball underhand 10 times    Strength: [x] NT  [] MMT completed:    ROM: [x] NT  [] ROM measurements:     Manual:   Manual therapy  Soft Tissue Mobalization: MFD cupping to L brachialradialis first gliding technique followed by 3 static cups x4 mins for decreased pain and tightness; total manual x10 mins    Modalities:  Modalities  Moist heat: Moist heat x10 min to L elbow for post exertion pain control and mm relaxation     *Indicates exercise, modality, or manual techniques to be initiated when appropriate    Assessment: Body structures, Functions, Activity limitations: Decreased strength, Increased pain  Assessment: No increase in pain reported w/ therex. Pt reporting min \"discomfort\" w/ MFD cupping however stating \"I can tell it's hitting that spot. \" Pt hesitant to increase weight/reps this date d/t not wanting to be too sore after returning to work this week. Treatment Diagnosis: S/P left distal bicep repair  Prognosis: Excellent     Goals:  Short term goals  Time Frame for Short term goals: 3-7  Short term goal 1: Increase range of motion of the left shoulder to full motion all planes (goal met)  Short term goal 2: Increase left elbow supination 0-90 degrees (goal met )  Short term goal 3: Pain decreased with activity to 5/10  (goal met )  Short term goal 4:  Increase  strength by 10 to 20 lbs per each measurement on the left (goal met)  Long term goals  Time Frame for Long term goals : 8-16  Long term goal 1: Left elbow full range of motion (goal met )  Long term goal 2: Strength of the left shoulder 5/5 (goal met)  Long term goal 3: Strength of left elbow 5/5 (goal met)  Long term goal 4: No further pain with use of the left elbow  Long term goal 5: Patient able to go back to full duty work including lifitng, carrying, catching, and ballistic movements without c/o pain  Progress toward goals: increase strength, ROM, decrease pain    POST-PAIN       Pain Rating (0-10 pain scale):   1/10   Location and pain description same as pre-treatment unless indicated. Action: [] NA   [x] Perform HEP  [] Meds as prescribed  [] Modalities as prescribed   [] Call Physician     Frequency/Duration:  Plan  Times per week: 2  Current Treatment Recommendations: Strengthening, Pain Management, Integrated Dry Needling     Pt to continue current HEP. See objective section for any therapeutic exercise changes, additions or modifications this date.     PT Individual Minutes  Time In: 9644  Time Out: 1030  Minutes: 56  Timed Code Treatment Minutes: 46 Minutes  Procedure Minutes: 10     Modality Time In Time Out Total Minutes Units    Ther ex (69099) 939 1010 36 2   Manual Therapy (76289) 1010 1020 10 1   MHP (04899) 1020 1030 10 0     Signature:  Electronically signed by Manuelito Alvarez PTA on 9/14/21 at 9:39 AM EDT

## 2021-09-16 ENCOUNTER — HOSPITAL ENCOUNTER (OUTPATIENT)
Dept: PHYSICAL THERAPY | Age: 51
Setting detail: THERAPIES SERIES
Discharge: HOME | End: 2021-09-16
Payer: COMMERCIAL

## 2021-09-16 PROCEDURE — 97110 THERAPEUTIC EXERCISES: CPT | Performed by: PHYSICAL THERAPIST

## 2021-09-16 ASSESSMENT — PAIN DESCRIPTION - ONSET: ONSET: ON-GOING

## 2021-09-16 ASSESSMENT — PAIN SCALES - GENERAL: PAINLEVEL_OUTOF10: 4

## 2021-09-16 ASSESSMENT — PAIN DESCRIPTION - PAIN TYPE: TYPE: SURGICAL PAIN

## 2021-09-16 ASSESSMENT — PAIN DESCRIPTION - DESCRIPTORS: DESCRIPTORS: ACHING

## 2021-09-16 ASSESSMENT — PAIN DESCRIPTION - FREQUENCY: FREQUENCY: INTERMITTENT

## 2021-09-16 ASSESSMENT — PAIN DESCRIPTION - ORIENTATION: ORIENTATION: LEFT

## 2021-09-16 ASSESSMENT — PAIN DESCRIPTION - LOCATION: LOCATION: ELBOW

## 2021-09-16 ASSESSMENT — PAIN - FUNCTIONAL ASSESSMENT: PAIN_FUNCTIONAL_ASSESSMENT: PREVENTS OR INTERFERES SOME ACTIVE ACTIVITIES AND ADLS

## 2021-09-16 ASSESSMENT — PAIN DESCRIPTION - PROGRESSION: CLINICAL_PROGRESSION: GRADUALLY IMPROVING

## 2021-09-16 NOTE — PROGRESS NOTES
[address] [facility]  Outpatient Physical Therapy    Treatment Note        Date: 2021  Patient: Zandra Mcdaniel  : 1970  ACCT #: [de-identified]  Referring Practitioner: Eliza Patel M.D. Diagnosis: Biceps rupture, distal, left, subsequent encounter  Treatment Diagnosis: S/P left distal bicep repair     Visit Information:  PT Visit Information  Onset Date: 21  Total # of Visits to Date: 34  Plan of Care/Certification Expiration Date: 10/08/21  Progress Note Counter:     Subjective: Patient reports that she is \"sore all over\" from being back at work. Left elbow is okay, she tended to use the right arm more. She reports the cupping really did help from the last session. HEP Compliance:  [x] Good [] Fair [] Poor [] Reports not doing due to:    Vital Signs  Patient Currently in Pain: Yes   Pain Screening  Patient Currently in Pain: Yes  Pain Assessment  Pain Assessment: 0-10  Pain Level: 4  Patient's Stated Pain Goal: No pain  Pain Type: Surgical pain  Pain Location: Elbow  Pain Orientation: Left  Pain Descriptors: Aching  Pain Frequency: Intermittent  Pain Onset: On-going  Clinical Progression: Gradually improving  Functional Pain Assessment: Prevents or interferes some active activities and ADLs    OBJECTIVE:   Exercises  Exercise 3: Chest Press with eccentric return 5 pl 2 x 12  Exercise 4: Standing elbow flexion 2 way (thumb up, plam down) 5# x15 ea.   Exercise 7: UBE level 4.3- 4 min forward and 4 minutes backwards  Exercise 9: weighted hammer supination, pronation 2.5# elbow flexed x10, no added weight elbow ext x10  Exercise 12: Tricep extension on Apollo machine 40# 2x10  (focus on eccentric return)  Exercise 13: Lat pull down on Apollo machine 40# 2x18  Exercise 14: Rows and straight arm pulldowns on Apollo Machine 40# 2x18 each  Exercise 15: bicep stretch at ladder 30''x5  Exercise 16: lat pull stretch at ladder 30''x5  Exercise 17: corner stretch at door 30''x3  Exercise 18: bent over reverse fly with 4# weight x15  Exercise 20: catch light weight ball palm up and arm in nuetral with 4# ball x15-20; therapist standing on 8'' step and dropping 2.5kg 15-20x, throwing 3# ball underhand 10 times       Modalities:  Modalities  Moist heat: Moist heat x10 min to L elbow for post exertion pain control and mm relaxation  Cryotherapy (Minutes\Location): Ice pack to the L elbow after exercise x10 mins     *Indicates exercise, modality, or manual techniques to be initiated when appropriate    Assessment:   Activity Tolerance  Activity Tolerance: Patient Tolerated treatment well    Body structures, Functions, Activity limitations: Decreased strength, Increased pain  Assessment: Patient did have some discomfor mostly her whole body from returning to work. She is being very careful with her left arm. Treatment Diagnosis: S/P left distal bicep repair  Prognosis: Excellent  PT Education: Home Exercise Program    Goals:  Short term goals  Time Frame for Short term goals: 3-7  Short term goal 1: Increase range of motion of the left shoulder to full motion all planes (goal met)  Short term goal 2: Increase left elbow supination 0-90 degrees (goal met )  Short term goal 3: Pain decreased with activity to 5/10  (goal met )  Short term goal 4:  Increase  strength by 10 to 20 lbs per each measurement on the left (goal met)    Long term goals  Time Frame for Long term goals : 8-16  Long term goal 1: Left elbow full range of motion (goal met )  Long term goal 2: Strength of the left shoulder 5/5 (goal met)  Long term goal 3: Strength of left elbow 5/5 (goal met)  Long term goal 4: No further pain with use of the left elbow  Long term goal 5: Patient able to go back to full duty work including lifitng, carrying, catching, and ballistic movements without c/o pain  Progress toward goals: Good    POST-PAIN       Pain Rating (0-10 pain scale):   2/10   Location and pain description same as pre-treatment unless indicated. Action: [] NA   [x] Perform HEP  [] Meds as prescribed  [] Modalities as prescribed   [] Call Physician     Frequency/Duration:  Plan  Times per week: 2  Plan weeks: 6-8  Current Treatment Recommendations: Strengthening, Pain Management     Pt to continue current HEP. See objective section for any therapeutic exercise changes, additions or modifications this date.          PT Individual Minutes  Time In: 2050  Time Out: 1005  Minutes: 60  Timed Code Treatment Minutes: 40 Minutes  Procedure Minutes:     Modality Time In Time Out Total Minutes Units    Ther ex ([id number]) 0986 2137 36 8   Manual Therapy ([id number])       Neuro re-ed (86706)       Massage ([id number])       Estim unattended   ([id number])         Signature:  Electronically signed by Desmond Andrews PT on 9/16/21 at 10:04 AM EDT

## 2021-09-21 ENCOUNTER — HOSPITAL ENCOUNTER (OUTPATIENT)
Dept: PHYSICAL THERAPY | Age: 51
Setting detail: THERAPIES SERIES
Discharge: HOME | End: 2021-09-21
Payer: COMMERCIAL

## 2021-09-21 PROCEDURE — 97110 THERAPEUTIC EXERCISES: CPT

## 2021-09-21 PROCEDURE — 97140 MANUAL THERAPY 1/> REGIONS: CPT

## 2021-09-21 ASSESSMENT — PAIN SCALES - GENERAL: PAINLEVEL_OUTOF10: 2

## 2021-09-21 ASSESSMENT — PAIN DESCRIPTION - ORIENTATION: ORIENTATION: LEFT

## 2021-09-21 ASSESSMENT — PAIN DESCRIPTION - DESCRIPTORS: DESCRIPTORS: ACHING

## 2021-09-21 ASSESSMENT — PAIN DESCRIPTION - LOCATION: LOCATION: ELBOW

## 2021-09-21 ASSESSMENT — PAIN DESCRIPTION - PAIN TYPE: TYPE: SURGICAL PAIN

## 2021-09-21 NOTE — PROGRESS NOTES
[address] [facility]  Outpatient Physical Therapy    Treatment Note        Date: 2021  Patient: Nimesh Garza  : 1970  ACCT #: [de-identified]  Referring Practitioner: Amy Clancy M.D. Diagnosis: Biceps rupture, distal, left, subsequent encounter  Treatment Diagnosis: S/P left distal bicep repair     Visit Information:  PT Visit Information  Onset Date: 21  Total # of Visits to Date: 27  Plan of Care/Certification Expiration Date: 10/08/21  No Show: 0  Canceled Appointment: 0  Progress Note Counter:     Subjective: Pt reports \"soreness all over\" still from RTW. Comments: New C-9 received and a end date of 10/8/21  HEP Compliance:  [x] Good [] Fair [] Poor [] Reports not doing due to:    Vital Signs  Patient Currently in Pain: Yes   Pain Screening  Patient Currently in Pain: Yes  Pain Assessment  Pain Assessment: 0-10  Pain Level: 2  Pain Type: Surgical pain  Pain Location: Elbow  Pain Orientation: Left  Pain Descriptors: Aching    OBJECTIVE:   Exercises  Exercise 3: Chest Press with eccentric return 5 pl 2 x 12  Exercise 4: Standing elbow flexion 2 way (thumb up, plam down) 5# 2x15 ea.   Exercise 7: UBE level 4.5- 4 min forward and 4 minutes backwards  Exercise 12: Tricep extension on Apollo machine 40# 2x10  (focus on eccentric return)  Exercise 13: Lat pull down on Apollo machine 40# 2x20  Exercise 14: Rows and straight arm pulldowns on Apollo Machine 40# 2x20 each  Exercise 18: bent over reverse fly with 4# weight x15  Exercise 20: catch light weight ball palm up and arm in nuetral with 4# ball x15-20; therapist standing on 8'' step and dropping 2.5kg 15-20x, throwing 3# ball underhand 10 times    Strength: [x] NT  [] MMT completed:    ROM: [x] NT  [] ROM measurements:     Manual:   Manual therapy  Soft Tissue Mobalization: MFD cupping to L brachialradialis first gliding technique followed by 3 static cups x4 mins for decreased pain and tightness; total manual x10 mins    Modalities:  Modalities  Moist heat: Moist heat x10 min to L elbow for post exertion pain control and mm relaxation     *Indicates exercise, modality, or manual techniques to be initiated when appropriate    Assessment: Body structures, Functions, Activity limitations: Decreased strength, Increased pain  Assessment: Cont w/ MFD cupping techniques w/ good relief reported by pt. Pt reports muscle soreness and fatigue throughout session, though tolerable. Pt reports mos difficulty w/ catching ball and bicep curls. Treatment Diagnosis: S/P left distal bicep repair  Prognosis: Excellent     Goals:  Short term goals  Time Frame for Short term goals: 3-7  Short term goal 1: Increase range of motion of the left shoulder to full motion all planes (goal met)  Short term goal 2: Increase left elbow supination 0-90 degrees (goal met )  Short term goal 3: Pain decreased with activity to 5/10  (goal met )  Short term goal 4: Increase  strength by 10 to 20 lbs per each measurement on the left (goal met)  Long term goals  Time Frame for Long term goals : 8-16  Long term goal 1: Left elbow full range of motion (goal met )  Long term goal 2: Strength of the left shoulder 5/5 (goal met)  Long term goal 3: Strength of left elbow 5/5 (goal met)  Long term goal 4: No further pain with use of the left elbow  Long term goal 5: Patient able to go back to full duty work including lifitng, carrying, catching, and ballistic movements without c/o pain  Progress toward goals: increase strength, ROM, decrease pain    POST-PAIN       Pain Rating (0-10 pain scale):   1/10   Location and pain description same as pre-treatment unless indicated. Action: [] NA   [x] Perform HEP  [] Meds as prescribed  [] Modalities as prescribed   [] Call Physician     Frequency/Duration:  Plan  Times per week: 2  Plan weeks: 6-8  Current Treatment Recommendations: Strengthening, Pain Management     Pt to continue current HEP.   See objective section for any therapeutic exercise changes, additions or modifications this date.     PT Individual Minutes  Time In: 0935  Time Out: 1030  Minutes: 55  Timed Code Treatment Minutes: 45 Minutes  Procedure Minutes: 10     Modality Time In Time Out Total Minutes Units    Ther ex ([id number]) 935 1010 35 2   Manual Therapy (82918) 1010 1020 10 1   MHP (88182) 1020 1030 10 0     Signature:  Electronically signed by Leandro Rayo PTA on 9/21/21 at 9:39 AM EDT

## 2021-09-23 ENCOUNTER — HOSPITAL ENCOUNTER (OUTPATIENT)
Dept: PHYSICAL THERAPY | Age: 51
Setting detail: THERAPIES SERIES
Discharge: HOME | End: 2021-09-23
Payer: COMMERCIAL

## 2021-09-23 PROCEDURE — 97110 THERAPEUTIC EXERCISES: CPT | Performed by: PHYSICAL THERAPIST

## 2021-09-23 ASSESSMENT — PAIN DESCRIPTION - FREQUENCY: FREQUENCY: INTERMITTENT

## 2021-09-23 ASSESSMENT — PAIN DESCRIPTION - PAIN TYPE: TYPE: SURGICAL PAIN;CHRONIC PAIN

## 2021-09-23 ASSESSMENT — PAIN SCALES - GENERAL
PAINLEVEL_OUTOF10: 2
PAINLEVEL_OUTOF10: 2

## 2021-09-23 ASSESSMENT — PAIN DESCRIPTION - ORIENTATION: ORIENTATION: LEFT

## 2021-09-23 ASSESSMENT — PAIN DESCRIPTION - ONSET: ONSET: ON-GOING

## 2021-09-23 ASSESSMENT — PAIN DESCRIPTION - PROGRESSION: CLINICAL_PROGRESSION: GRADUALLY IMPROVING

## 2021-09-23 ASSESSMENT — PAIN - FUNCTIONAL ASSESSMENT: PAIN_FUNCTIONAL_ASSESSMENT: ACTIVITIES ARE NOT PREVENTED

## 2021-09-23 ASSESSMENT — PAIN DESCRIPTION - LOCATION: LOCATION: ELBOW

## 2021-09-23 NOTE — PROGRESS NOTES
[address] [facility]  Outpatient Physical Therapy    Treatment Note        Date: 2021  Patient: Ivette Mccoy  : 1970  ACCT #: [de-identified]     Diagnosis: Biceps rupture, distal, left, subsequent encounter  Treatment Diagnosis: S/P left distal bicep repair     Visit Information:  PT Visit Information  Onset Date: 21  PT Insurance Information: [facility]- claim #[id number]  Total # of Visits Approved: 12  Total # of Visits to Date: 32  Plan of Care/Certification Expiration Date: 10/08/21  No Show: 0  Canceled Appointment: 0  Progress Note Counter:     Subjective: Patient reports right upper trap and shoulder from work, left  left elbow is about the same. HEP Compliance:  [x] Good [] Fair [] Poor [] Reports not doing due to:    Vital Signs  Patient Currently in Pain: Yes   Pain Screening  Patient Currently in Pain: Yes  Pain Assessment  Pain Assessment: 0-10  Pain Level: 2  Patient's Stated Pain Goal: No pain  Pain Type: Surgical pain;Chronic pain  Pain Location: Elbow  Pain Orientation: Left  Pain Frequency: Intermittent  Pain Onset: On-going  Clinical Progression: Gradually improving  Functional Pain Assessment: Activities are not prevented    OBJECTIVE:   Exercises  Exercise 3: Chest Press with eccentric return 5 pl 2 x 12  Exercise 4: Standing elbow flexion 2 way (thumb up, plam down) 5# 2x15 ea.   Exercise 7: UBE level 4.5- 4 min forward and 4 minutes backwards  Exercise 9: weighted hammer supination, pronation 2.5# elbow flexed x10, no added weight elbow ext x10  Exercise 12: Tricep extension on Apollo machine 40# 2x10  (focus on eccentric return)  Exercise 13: Lat pull down on Apollo machine 40# 2x20  Exercise 14: Rows and straight arm pulldowns on Apollo Machine 40# 2x20 each  Exercise 15: bicep stretch at ladder 30''x5  Exercise 16: lat pull stretch at ladder 30''x5  Exercise 18: bent over reverse fly with 4# weight x15  Exercise 20: catch light weight ball palm up and arm in nuetral with 4# ball x15-20; therapist standing on 8'' step and dropping 2.5kg 15-20x, throwing 3# ball underhand 10 times     Strength: [] NT  [x] MMT completed:      Strength LUE  Strength LUE: WNL       ROM: [] NT  [x] ROM measurements:         AROM LUE (degrees)  LUE AROM : WNL  L Shoulder Flexion 0-180: 0-180  L Shoulder Extension 0-45: 0-45  L Shoulder ABduction 0-180: 0-180  L Shoulder Int Rotation  0-70: 0-70  L Shoulder Ext Rotation  0-90: 0-90  L Elbow Flexion 0-145: 0-150  L Elbow Extension 145-0: 145  L Forearm Pron 0-90: 0-90  L Forearm Supination  0-90: 0-90  L Wrist Flexion 0-80: 0-80  L Wrist Extension 0-70: 0-70          Modalities: Cold pack        *Indicates exercise, modality, or manual techniques to be initiated when appropriate    Assessment:   Activity Tolerance  Activity Tolerance: Patient Tolerated treatment well    Body structures, Functions, Activity limitations: Increased pain (Unable to tolerate regular work duty)  Assessment: Patient continues to make progress as far as her strength and range of motion. She is using her right shoulder and upper extremity more than her left. She is back to work and being cautious of her left arm. Treatment Diagnosis: S/P left distal bicep repair  Prognosis: Excellent  Patient Education: Continue with stretching throughout the day, even at work    Goals:  Short term goals  Time Frame for Short term goals: 3-7  Short term goal 1: Increase range of motion of the left shoulder to full motion all planes (goal met)  Short term goal 2: Increase left elbow supination 0-90 degrees (goal met )  Short term goal 3: Pain decreased with activity to 5/10  (goal met )  Short term goal 4:  Increase  strength by 10 to 20 lbs per each measurement on the left (goal met)    Long term goals  Time Frame for Long term goals : 8-16  Long term goal 1: Left elbow full range of motion (goal met )  Long term goal 2: Strength of the left shoulder 5/5 (goal met)  Long term goal 3: Strength of left elbow 5/5 (goal met)  Long term goal 4: No further pain with use of the left elbow  Long term goal 5: Patient able to go back to full duty work including [employer], carrying, catching, and ballistic movements without c/o pain  Progress toward goals:Good    POST-PAIN       Pain Rating (0-10 pain scale): 2  /10   Location and pain description same as pre-treatment unless indicated. Action: [] NA   [x] Perform HEP  [] Meds as prescribed  [] Modalities as prescribed   [] Call Physician     Frequency/Duration:  Plan  Times per week: 2  Plan weeks: 6-8  Current Treatment Recommendations: Strengthening, Pain Management     Pt to continue current HEP. See objective section for any therapeutic exercise changes, additions or modifications this date.          PT Individual Minutes  Time In: 0900  Time Out: 1000  Minutes: 60  Timed Code Treatment Minutes: 50 Minutes  Procedure Minutes:10 minutes cold pack   Modality Time In Time Out Total Minutes Units    Ther ex (19772) 0900 0950 50 3   Manual Therapy ([id number])       Neuro re-ed (62924)       Massage ([id number])       Estim unattended   ([id number])         Signature:  Electronically signed by Maty Xiao PT on 9/23/21 at 9:59 AM EDT

## 2021-09-28 ENCOUNTER — HOSPITAL ENCOUNTER (OUTPATIENT)
Dept: PHYSICAL THERAPY | Age: 51
Setting detail: THERAPIES SERIES
Discharge: HOME | End: 2021-09-28
Payer: COMMERCIAL

## 2021-09-28 DIAGNOSIS — Z00.00 PREVENTATIVE HEALTH CARE: Primary | ICD-10-CM

## 2021-09-28 PROCEDURE — 97110 THERAPEUTIC EXERCISES: CPT

## 2021-09-28 PROCEDURE — 97140 MANUAL THERAPY 1/> REGIONS: CPT

## 2021-09-28 RX ORDER — ALBUTEROL SULFATE 90 UG/1
AEROSOL, METERED RESPIRATORY (INHALATION)
Qty: 18 G | Refills: 2 | Status: SHIPPED | OUTPATIENT
Start: 2021-09-28 | End: 2021-11-15

## 2021-09-28 RX ORDER — LEVOTHYROXINE SODIUM 0.12 MG/1
TABLET ORAL
Qty: 60 TABLET | Refills: 2 | Status: SHIPPED | OUTPATIENT
Start: 2021-09-28

## 2021-09-28 ASSESSMENT — PAIN DESCRIPTION - PAIN TYPE: TYPE: SURGICAL PAIN;CHRONIC PAIN

## 2021-09-28 ASSESSMENT — PAIN DESCRIPTION - ORIENTATION: ORIENTATION: LEFT

## 2021-09-28 ASSESSMENT — PAIN DESCRIPTION - LOCATION: LOCATION: ELBOW

## 2021-09-28 ASSESSMENT — PAIN SCALES - GENERAL: PAINLEVEL_OUTOF10: 1

## 2021-09-28 ASSESSMENT — PAIN DESCRIPTION - DESCRIPTORS: DESCRIPTORS: ACHING

## 2021-09-28 NOTE — PROGRESS NOTES
[address] [facility]  Outpatient Physical Therapy    Treatment Note        Date: 2021  Patient: Benigno Clayton  : 1970  ACCT #: [de-identified]  Referring Practitioner: Rosalino Espinoza M.D. Diagnosis: Biceps rupture, distal, left, subsequent encounter  Treatment Diagnosis: S/P left distal bicep repair     Visit Information:  PT Visit Information  Onset Date: 21  PT Insurance Information: [facility]- claim #[id number]  Total # of Visits to Date: 28  Plan of Care/Certification Expiration Date: 10/08/21  No Show: 0  Canceled Appointment: 0  Progress Note Counter:     Subjective: Pt reports her R shoulder and neck are still sore since RTW. Pt reports about 1/10 bicep pain currently. Comments: New C-9 received and a end date of 10/8/21  HEP Compliance:  [x] Good [] Fair [] Poor [] Reports not doing due to:    Vital Signs  Patient Currently in Pain: Yes   Pain Screening  Patient Currently in Pain: Yes  Pain Assessment  Pain Assessment: 0-10  Pain Level: 1  Pain Type: Surgical pain;Chronic pain  Pain Location: Elbow  Pain Orientation: Left  Pain Descriptors: Aching    OBJECTIVE:   Exercises  Exercise 3: Chest Press with eccentric return 5 pl 2 x 12  Exercise 4: Standing elbow flexion 2 way (palm up) 8# 3x10 ea.   Exercise 7: UBE level 4.5- 4 min forward and 4 minutes backwards  Exercise 8: midrows 40# 2x10  Exercise 12: Tricep extension on Apollo machine 40# 2x10  (focus on eccentric return)  Exercise 13: Lat pull down on Apollo machine 40# 2x20  Exercise 14: Rows and straight arm pulldowns on Apollo Machine 40# 2x20 each  Exercise 20: catch light weight ball palm up and arm in nuetral with 4# ball x15-20; therapist standing on 8'' step and dropping 2.5kg 15-20x, throwing 3# ball underhand 10 times    Strength: [x] NT  [] MMT completed:    ROM: [x] NT     Manual:   Manual therapy  Soft Tissue Mobalization: MFD cupping to L brachialradialis first gliding technique followed by 3 static cups x4 mins for decreased pain and tightness; total manual x10 mins    Modalities:  Modalities  Moist heat: Moist heat x10 min to L elbow for post exertion pain control and mm relaxation     *Indicates exercise, modality, or manual techniques to be initiated when appropriate    Assessment: Body structures, Functions, Activity limitations: Increased pain (Unable to tolerate regular work duty)  Assessment: Pt cont's to report relief w/ cupping techniques. Encouraged pt to gradually increase use of L arm at work as tolerated. Treatment Diagnosis: S/P left distal bicep repair  Prognosis: Excellent     Goals:  Short term goals  Time Frame for Short term goals: 3-7  Short term goal 1: Increase range of motion of the left shoulder to full motion all planes (goal met)  Short term goal 2: Increase left elbow supination 0-90 degrees (goal met )  Short term goal 3: Pain decreased with activity to 5/10  (goal met )  Short term goal 4: Increase  strength by 10 to 20 lbs per each measurement on the left (goal met)  Long term goals  Time Frame for Long term goals : 8-16  Long term goal 1: Left elbow full range of motion (goal met )  Long term goal 2: Strength of the left shoulder 5/5 (goal met)  Long term goal 3: Strength of left elbow 5/5 (goal met)  Long term goal 4: No further pain with use of the left elbow  Long term goal 5: Patient able to go back to full duty work including lifitng, carrying, catching, and ballistic movements without c/o pain  Progress toward goals: increase RTW tolerance    POST-PAIN       Pain Rating (0-10 pain scale):   0/10   Location and pain description same as pre-treatment unless indicated. Action: [] NA   [x] Perform HEP  [] Meds as prescribed  [] Modalities as prescribed   [] Call Physician     Frequency/Duration:  Plan  Times per week: 2  Plan weeks: 6-8  Current Treatment Recommendations: Strengthening, Pain Management     Pt to continue current HEP.   See objective section for any therapeutic

## 2021-09-30 ENCOUNTER — HOSPITAL ENCOUNTER (OUTPATIENT)
Dept: PHYSICAL THERAPY | Age: 51
Setting detail: THERAPIES SERIES
Discharge: HOME | End: 2021-09-30
Payer: COMMERCIAL

## 2021-09-30 PROCEDURE — 97110 THERAPEUTIC EXERCISES: CPT | Performed by: PHYSICAL THERAPIST

## 2021-09-30 ASSESSMENT — PAIN - FUNCTIONAL ASSESSMENT: PAIN_FUNCTIONAL_ASSESSMENT: ACTIVITIES ARE NOT PREVENTED

## 2021-09-30 ASSESSMENT — PAIN DESCRIPTION - DESCRIPTORS: DESCRIPTORS: ACHING

## 2021-09-30 ASSESSMENT — PAIN SCALES - GENERAL: PAINLEVEL_OUTOF10: 1

## 2021-09-30 ASSESSMENT — PAIN DESCRIPTION - FREQUENCY: FREQUENCY: INTERMITTENT

## 2021-09-30 ASSESSMENT — PAIN DESCRIPTION - ORIENTATION: ORIENTATION: LEFT

## 2021-09-30 ASSESSMENT — PAIN DESCRIPTION - PAIN TYPE: TYPE: SURGICAL PAIN;CHRONIC PAIN

## 2021-09-30 ASSESSMENT — PAIN DESCRIPTION - PROGRESSION: CLINICAL_PROGRESSION: GRADUALLY IMPROVING

## 2021-09-30 NOTE — PROGRESS NOTES
throwing 3# ball underhand 10 times    Modalities:  Modalities  Cryotherapy (Minutes\Location): Ice pack to the L elbow after exercise x10 mins     *Indicates exercise, modality, or manual techniques to be initiated when appropriate    Assessment: Body structures, Functions, Activity limitations:  (Unable to tolerate regular work duty)  Assessment: Patient contiues to make progress. She is still on restricted duty. .  Treatment Diagnosis: S/P left distal bicep repair  Prognosis: Excellent  PT Education: Home Exercise Program  Patient Education: Continue with stretching throughout the day, even at work    Goals:  Short term goals  Short term goal 1: Increase range of motion of the left shoulder to full motion all planes (goal met)  Short term goal 2: Increase left elbow supination 0-90 degrees (goal met )  Short term goal 3: Pain decreased with activity to 5/10  (goal met )  Short term goal 4: Increase  strength by 10 to 20 lbs per each measurement on the left (goal met)    Long term goals  Time Frame for Long term goals : 8-16  Long term goal 1: Left elbow full range of motion (goal met )  Long term goal 2: Strength of the left shoulder 5/5 (goal met)  Long term goal 3: Strength of left elbow 5/5 (goal met)  Long term goal 4: No further pain with use of the left elbow  Long term goal 5: Patient able to go back to full duty work including lifitng, carrying, catching, and ballistic movements without c/o pain  Progress toward goals: Good    POST-PAIN       Pain Rating (0-10 pain scale): 1/10   Location and pain description same as pre-treatment unless indicated. Action: [] NA   [x] Perform HEP  [] Meds as prescribed  [] Modalities as prescribed   [] Call Physician     Frequency/Duration:  Plan  Times per week: 2  Plan weeks: 6-8  Current Treatment Recommendations: Strengthening, Pain Management     Pt to continue current HEP.   See objective section for any therapeutic exercise changes, additions or modifications this date.          PT Individual Minutes  Time In: 0713  Time Out: 1000  Minutes: 50  Timed Code Treatment Minutes: 40 Minutes  Procedure Minutes:Ice 10 minutes   Modality Time In Time Out Total Minutes Units    Ther ex (79528) 4461 5771 00 3   Manual Therapy ([id number])       Neuro re-ed (51007)       Massage (24379)       Estim unattended   ([id number])         Signature:  Electronically signed by Brisa Palomino PT on 9/30/21 at 9:53 AM EDT

## 2021-10-05 ENCOUNTER — HOSPITAL ENCOUNTER (OUTPATIENT)
Dept: PHYSICAL THERAPY | Age: 51
Setting detail: THERAPIES SERIES
Discharge: HOME | End: 2021-10-05
Payer: COMMERCIAL

## 2021-10-05 PROCEDURE — 97140 MANUAL THERAPY 1/> REGIONS: CPT

## 2021-10-05 PROCEDURE — 97110 THERAPEUTIC EXERCISES: CPT

## 2021-10-05 ASSESSMENT — PAIN DESCRIPTION - LOCATION: LOCATION: ELBOW

## 2021-10-05 ASSESSMENT — PAIN DESCRIPTION - ORIENTATION: ORIENTATION: LEFT

## 2021-10-05 ASSESSMENT — PAIN SCALES - GENERAL: PAINLEVEL_OUTOF10: 2

## 2021-10-05 ASSESSMENT — PAIN DESCRIPTION - DESCRIPTORS: DESCRIPTORS: ACHING;SORE

## 2021-10-05 ASSESSMENT — PAIN DESCRIPTION - PAIN TYPE: TYPE: CHRONIC PAIN

## 2021-10-05 NOTE — PROGRESS NOTES
exertion pain control and mm relaxation     *Indicates exercise, modality, or manual techniques to be initiated when appropriate    Assessment: Body structures, Functions, Activity limitations:  (unable to tolerate regular work duty)  Assessment: Gradual increases in reps/resistance of select exercises. Pt reports cont'd relief w/ cupping and MHP, especially after increase in pain yesterday. Treatment Diagnosis: S/P left distal bicep repair  Prognosis: Excellent     Goals:  Short term goals  Short term goal 1: Increase range of motion of the left shoulder to full motion all planes (goal met)  Short term goal 2: Increase left elbow supination 0-90 degrees (goal met )  Short term goal 3: Pain decreased with activity to 5/10  (goal met )  Short term goal 4: Increase  strength by 10 to 20 lbs per each measurement on the left (goal met)  Long term goals  Time Frame for Long term goals : 8-16  Long term goal 1: Left elbow full range of motion (goal met )  Long term goal 2: Strength of the left shoulder 5/5 (goal met)  Long term goal 3: Strength of left elbow 5/5 (goal met)  Long term goal 4: No further pain with use of the left elbow  Long term goal 5: Patient able to go back to full duty work including lifitng, carrying, catching, and ballistic movements without c/o pain  Progress toward goals: work sim/dec tightness    POST-PAIN       Pain Rating (0-10 pain scale):   \"very warm\"/10   Location and pain description same as pre-treatment unless indicated. Action: [] NA   [x] Perform HEP  [] Meds as prescribed  [] Modalities as prescribed   [] Call Physician     Frequency/Duration:  Plan  Times per week: 2  Plan weeks: 6-8  Current Treatment Recommendations: Strengthening, Pain Management     Pt to continue current HEP. See objective section for any therapeutic exercise changes, additions or modifications this date.     PT Individual Minutes  Time In: 0935  Time Out: 1030  Minutes: 55  Timed Code Treatment Minutes: 39 Minutes  Procedure Minutes:10     Modality Time In Time Out Total Minutes Units    Ther ex (40841) 676 6527 35 2   Manual Therapy (11982) 1012 1020 10 1   MHP (29610) 1022 1030 10 0     Signature:  Electronically signed by Elvira Mon PTA on 10/5/21 at 9:41 AM EDT

## 2021-10-07 ENCOUNTER — HOSPITAL ENCOUNTER (OUTPATIENT)
Dept: PHYSICAL THERAPY | Age: 51
Setting detail: THERAPIES SERIES
Discharge: HOME | End: 2021-10-07
Payer: COMMERCIAL

## 2021-10-07 PROCEDURE — 97110 THERAPEUTIC EXERCISES: CPT

## 2021-10-07 PROCEDURE — 97124 MASSAGE THERAPY: CPT

## 2021-10-07 ASSESSMENT — PAIN SCALES - GENERAL: PAINLEVEL_OUTOF10: 2

## 2021-10-07 ASSESSMENT — PAIN DESCRIPTION - FREQUENCY: FREQUENCY: CONTINUOUS

## 2021-10-07 ASSESSMENT — PAIN DESCRIPTION - DESCRIPTORS: DESCRIPTORS: ACHING;CONSTANT

## 2021-10-07 ASSESSMENT — PAIN DESCRIPTION - LOCATION: LOCATION: ARM

## 2021-10-07 ASSESSMENT — PAIN DESCRIPTION - ORIENTATION: ORIENTATION: LEFT

## 2021-10-07 ASSESSMENT — PAIN DESCRIPTION - PAIN TYPE: TYPE: ACUTE PAIN

## 2021-10-07 NOTE — PROGRESS NOTES
[address]  Outpatient Physical Therapy    Treatment Note        Date: 10/7/2021  Patient: Erika Byrd  : 1970  ACCT #: [de-identified]  Referring Practitioner: Kay Garcia M.D. 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 Pt reports brachioradialis region has been hurting since last visit. Pt has received extended C9 form until     HEP Compliance:  [x] Good [] Fair [] Poor [] Reports not doing due to:        Pain Assessment  Pain Assessment: 0-10  Pain Level: 2  Pain Type: Acute pain  Pain Location: Arm  Pain Orientation: Left  Pain Descriptors: Aching;Constant  Pain Frequency: Continuous    OBJECTIVE:   Exercises  Exercise 1: UBE level 6. .8- 4 min forward and 4 minutes backwards  Exercise 2: midrows 40# 2x15  Exercise 3: Tricep extension on Apollo machine 40# 2x18  (focus on eccentric return)  Exercise 4: Lat pull down on Apollo machine 40# 2x20  Exercise 5: bent over reverse fly with 5# weight 2x10  Exercise 6: catch light weight ball palm up and arm in nuetral with 4# ball x15-20; dropping 4# ball from higher level and catching x20; underhand tossing x20  Exercise 7: bicep curls 5# L 10# R palm up 2x10  Exercise 12: Tricep extension on Apollo machine 40# 2x10  (focus on eccentric return)  Exercise 13: Lat pull down on Apollo machine 40# 2x20  Exercise 14: Rows and straight arm pulldowns on Apollo Machine 40# 2x20 each  Exercise 18: bent over reverse fly with 5# weight x15          Manual:   Manual therapy  Soft Tissue Mobalization: STM to left brachioradialis x 8 followed by CP    Modalities:    Assessment:   Activity Tolerance  Activity Tolerance: Patient Tolerated treatment well    Body structures, Functions, Activity limitations: Decreased ADL status  Assessment: pt tolerated all ex's well without an increase in elbow/arm pain. Reports decrease in pain after STM and ice. Treatment Diagnosis: S/P left distal bicep repair  Prognosis: Excellent       Goals:  Short term goals  Short term goal 1: Increase range of motion of the left shoulder to full motion all planes (goal met)  Short term goal 2: Increase left elbow supination 0-90 degrees (goal met )  Short term goal 3: Pain decreased with activity to 5/10  (goal met )  Short term goal 4: Increase  strength by 10 to 20 lbs per each measurement on the left (goal met)    Long term goals  Time Frame for Long term goals : 8-16  Long term goal 1: Left elbow full range of motion (goal met )  Long term goal 2: Strength of the left shoulder 5/5 (goal met)  Long term goal 3: Strength of left elbow 5/5 (goal met)  Long term goal 4: No further pain with use of the left elbow  Long term goal 5: Patient able to go back to full duty work including lifitng, carrying, catching, and ballistic movements without c/o pain  Progress toward goals: strengthening     POST-PAIN       Pain Rating (0-10 pain scale):  1 /10   Location and pain description same as pre-treatment unless indicated. Action: [] NA   [x] Perform HEP  [] Meds as prescribed  [] Modalities as prescribed   [] Call Physician     Frequency/Duration:  Plan  Times per week: 2  Plan weeks: 6-8  Current Treatment Recommendations: Strengthening, Pain Management  Plan Comment: C-9 extended until 10/15/21     Pt to continue current HEP. See objective section for any therapeutic exercise changes, additions or modifications this date.          PT Individual Minutes  Time In: 0900  Time Out: 8183  Minutes: 55  Timed Code Treatment Minutes: 45 Minutes  Procedure Minutes:10     Modality Time In Time Out Total Minutes Units    Ther ex (74285) [id number] 35 2   Manual Therapy ([id number]) [id number] 10 1     Signature:  Electronically signed by Dietrich Halsted on 10/7/21 at 10:19 AM EDT

## 2021-10-15 ENCOUNTER — HOSPITAL ENCOUNTER (OUTPATIENT)
Dept: PHYSICAL THERAPY | Age: 51
Setting detail: THERAPIES SERIES
Discharge: HOME | End: 2021-10-15
Payer: COMMERCIAL

## 2021-10-15 NOTE — PROGRESS NOTES
Therapy                            Cancellation/No-show Note      Date:  10/15/2021  Patient Name:  Josey Acosta  :  1970   MRN:  [id number]  Referring Practitioner: Elana Trinidad M.D. Diagnosis: Biceps rupture, distal, left, subsequent encounter    Visit Information:  PT Visit Information  Onset Date: 21  PT Insurance Information: [id number]- claim #[id number]  Total # of Visits Approved: 12  Total # of Visits to Date: 25  Plan of Care/Certification Expiration Date: 10/08/21  No Show: 0  Canceled Appointment: 1  Progress Note Counter: 10/12 cx 10/15/21    For today's appointment patient:  [x]  Cancelled  []  Rescheduled appointment  []  No-show   []  Called pt to remind of next appointment     Reason given by patient:  [x]  Patient ill  []  Conflicting appointment  []  No transportation    []  Conflict with work  []  No reason given  []  Other:      [] Pt has future appointments scheduled, no follow up needed  [] Pt requests to be on hold.     Reason:   If > 2 weeks please discuss with therapist.  [] Therapist to call pt for follow up    Signature: Electronically signed by Silvia Us PTA on 10/15/21 at 9:33 AM EDT

## 2021-11-01 NOTE — PROGRESS NOTES
Patient able to go back to full duty work including lifitng, carrying, catching, and ballistic movements without c/o pain Patient is back to her job duties. 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 [x] yes  [] no       [] yes  [] no       [] yes  [] no        Body structures, Functions, Activity limitations: Decreased ADL status  Assessment: pt tolerated all ex's well without an increase in elbow/arm pain. Reports decrease in pain after STM and ice. Prognosis: Excellent  Discharge Recommendations: Continue to assess pending progress           PLAN: [] Evaluate and Treat  Frequency/Duration:  Plan  Times per week: 2  Plan weeks: 6-8  Current Treatment Recommendations: Strengthening, Pain Management  Plan Comment: C-9 extended until 10/15/21           Patient  Cancelled on 10/15/2021 due to illness. Her C-9 expiration date was on 10/15/2021. She had 2 remaining sessions, however she is independent with her exercise program.  Therefore, will not seek to extend her program sessions at this time. She will be discharged from the active program due to goal attainment. Patient Status:[] Continue/ Initiate plan of Care    [x] Discharge PT. Recommend pt continue with HEP. [] Additional visits requested, Please re-certify for additional visits:          Signature: Electronically signed by Patricio Garber PT on 11/1/21 at 4:29 PM EDT      If you have any questions or concerns, please don't hesitate to call. Thank you for your referral.    I have reviewed this plan of care and certify a need for medically necessary rehabilitation services.     Physician Signature:__________________________________________________________  Date:  Please sign and return

## 2021-11-15 RX ORDER — ALBUTEROL SULFATE 90 UG/1
AEROSOL, METERED RESPIRATORY (INHALATION)
Qty: 18 G | Refills: 2 | Status: SHIPPED | OUTPATIENT
Start: 2021-11-15

## 2025-08-11 ENCOUNTER — OFFICE VISIT (OUTPATIENT)
Dept: PRIMARY CARE CLINIC | Age: 55
End: 2025-08-11
Payer: COMMERCIAL

## 2025-08-11 VITALS
DIASTOLIC BLOOD PRESSURE: 64 MMHG | WEIGHT: 149 LBS | HEIGHT: 67 IN | HEART RATE: 77 BPM | BODY MASS INDEX: 23.39 KG/M2 | SYSTOLIC BLOOD PRESSURE: 106 MMHG

## 2025-08-11 DIAGNOSIS — Z12.31 ENCOUNTER FOR SCREENING MAMMOGRAM FOR MALIGNANT NEOPLASM OF BREAST: ICD-10-CM

## 2025-08-11 DIAGNOSIS — E78.2 MIXED HYPERLIPIDEMIA: ICD-10-CM

## 2025-08-11 DIAGNOSIS — E03.9 ACQUIRED HYPOTHYROIDISM: ICD-10-CM

## 2025-08-11 DIAGNOSIS — Z00.01 ABNORMAL PHYSICAL EVALUATION: Primary | ICD-10-CM

## 2025-08-11 DIAGNOSIS — J45.20 MILD INTERMITTENT ASTHMA, UNSPECIFIED WHETHER COMPLICATED: ICD-10-CM

## 2025-08-11 DIAGNOSIS — R06.83 SNORING: ICD-10-CM

## 2025-08-11 DIAGNOSIS — Z12.4 SCREENING FOR CERVICAL CANCER: ICD-10-CM

## 2025-08-11 DIAGNOSIS — R61 NIGHT SWEATS: ICD-10-CM

## 2025-08-11 DIAGNOSIS — Z00.00 HEALTH CARE MAINTENANCE: ICD-10-CM

## 2025-08-11 DIAGNOSIS — Z12.11 SCREENING FOR COLON CANCER: ICD-10-CM

## 2025-08-11 PROBLEM — S46.212A RUPTURE OF LEFT DISTAL BICEPS TENDON: Status: RESOLVED | Noted: 2021-01-15 | Resolved: 2025-08-11

## 2025-08-11 PROCEDURE — 99204 OFFICE O/P NEW MOD 45 MIN: CPT | Performed by: STUDENT IN AN ORGANIZED HEALTH CARE EDUCATION/TRAINING PROGRAM

## 2025-08-11 PROCEDURE — 99386 PREV VISIT NEW AGE 40-64: CPT | Performed by: STUDENT IN AN ORGANIZED HEALTH CARE EDUCATION/TRAINING PROGRAM

## 2025-08-11 RX ORDER — BUDESONIDE AND FORMOTEROL FUMARATE DIHYDRATE 160; 4.5 UG/1; UG/1
2 AEROSOL RESPIRATORY (INHALATION) 2 TIMES DAILY
Qty: 10.2 G | Refills: 11 | Status: SHIPPED | OUTPATIENT
Start: 2025-08-11

## 2025-08-11 RX ORDER — BUDESONIDE AND FORMOTEROL FUMARATE DIHYDRATE 160; 4.5 UG/1; UG/1
AEROSOL RESPIRATORY (INHALATION)
COMMUNITY
End: 2025-08-11 | Stop reason: SDUPTHER

## 2025-08-11 RX ORDER — ALBUTEROL SULFATE 90 UG/1
INHALANT RESPIRATORY (INHALATION)
Qty: 18 G | Refills: 11 | Status: SHIPPED | OUTPATIENT
Start: 2025-08-11

## 2025-08-11 RX ORDER — LEVOTHYROXINE SODIUM 150 UG/1
TABLET ORAL
COMMUNITY
Start: 2025-08-05

## 2025-08-11 SDOH — ECONOMIC STABILITY: FOOD INSECURITY: WITHIN THE PAST 12 MONTHS, YOU WORRIED THAT YOUR FOOD WOULD RUN OUT BEFORE YOU GOT MONEY TO BUY MORE.: NEVER TRUE

## 2025-08-11 SDOH — ECONOMIC STABILITY: FOOD INSECURITY: WITHIN THE PAST 12 MONTHS, THE FOOD YOU BOUGHT JUST DIDN'T LAST AND YOU DIDN'T HAVE MONEY TO GET MORE.: NEVER TRUE

## 2025-08-11 SDOH — HEALTH STABILITY: PHYSICAL HEALTH: ON AVERAGE, HOW MANY MINUTES DO YOU ENGAGE IN EXERCISE AT THIS LEVEL?: 30 MIN

## 2025-08-11 SDOH — HEALTH STABILITY: PHYSICAL HEALTH: ON AVERAGE, HOW MANY DAYS PER WEEK DO YOU ENGAGE IN MODERATE TO STRENUOUS EXERCISE (LIKE A BRISK WALK)?: 3 DAYS

## 2025-08-11 ASSESSMENT — PATIENT HEALTH QUESTIONNAIRE - PHQ9
SUM OF ALL RESPONSES TO PHQ QUESTIONS 1-9: 2
2. FEELING DOWN, DEPRESSED OR HOPELESS: SEVERAL DAYS
1. LITTLE INTEREST OR PLEASURE IN DOING THINGS: SEVERAL DAYS

## 2025-08-15 DIAGNOSIS — E78.2 MIXED HYPERLIPIDEMIA: ICD-10-CM

## 2025-08-15 DIAGNOSIS — E03.9 ACQUIRED HYPOTHYROIDISM: ICD-10-CM

## 2025-08-15 DIAGNOSIS — R61 NIGHT SWEATS: ICD-10-CM

## 2025-08-15 LAB
ALBUMIN SERPL-MCNC: 4.4 G/DL (ref 3.5–4.6)
ALP SERPL-CCNC: 84 U/L (ref 40–130)
ALT SERPL-CCNC: 11 U/L (ref 0–33)
ANION GAP SERPL CALCULATED.3IONS-SCNC: 11 MEQ/L (ref 9–15)
AST SERPL-CCNC: 21 U/L (ref 0–35)
BILIRUB SERPL-MCNC: 0.4 MG/DL (ref 0.2–0.7)
BUN SERPL-MCNC: 13 MG/DL (ref 6–20)
CALCIUM SERPL-MCNC: 9 MG/DL (ref 8.5–9.9)
CHLORIDE SERPL-SCNC: 105 MEQ/L (ref 95–107)
CHOLEST SERPL-MCNC: 246 MG/DL (ref 0–199)
CO2 SERPL-SCNC: 25 MEQ/L (ref 20–31)
CREAT SERPL-MCNC: 0.63 MG/DL (ref 0.5–0.9)
ERYTHROCYTE [DISTWIDTH] IN BLOOD BY AUTOMATED COUNT: 15.4 % (ref 11.5–14.5)
GLOBULIN SER CALC-MCNC: 3 G/DL (ref 2.3–3.5)
GLUCOSE SERPL-MCNC: 96 MG/DL (ref 70–99)
HCT VFR BLD AUTO: 40.9 % (ref 37–47)
HDLC SERPL-MCNC: 68 MG/DL (ref 40–59)
HGB BLD-MCNC: 13.3 G/DL (ref 12–16)
LDL CHOLESTEROL: 162 MG/DL (ref 0–129)
MCH RBC QN AUTO: 27.5 PG (ref 27–31.3)
MCHC RBC AUTO-ENTMCNC: 32.5 % (ref 33–37)
MCV RBC AUTO: 84.7 FL (ref 79.4–94.8)
PLATELET # BLD AUTO: 352 K/UL (ref 130–400)
POTASSIUM SERPL-SCNC: 4.5 MEQ/L (ref 3.4–4.9)
PROT SERPL-MCNC: 7.4 G/DL (ref 6.3–8)
RBC # BLD AUTO: 4.83 M/UL (ref 4.2–5.4)
SODIUM SERPL-SCNC: 141 MEQ/L (ref 135–144)
T4 FREE SERPL-MCNC: 2.1 NG/DL (ref 0.84–1.68)
TRIGLYCERIDE, FASTING: 80 MG/DL (ref 0–150)
TSH REFLEX: 0.02 UIU/ML (ref 0.44–3.86)
WBC # BLD AUTO: 5.9 K/UL (ref 4.8–10.8)

## 2025-08-16 LAB
CORTISOL COLLECTION INFO: NORMAL
CORTISOL: 3.5 UG/DL (ref 2.5–19.5)
DHEA-S SERPL-MCNC: 63 UG/DL (ref 35.4–256)
ESTIMATED AVERAGE GLUCOSE: 114 MG/DL
FOLLICLE STIMULATING HORMONE: 97.8 MIU/ML
HBA1C MFR BLD: 5.6 % (ref 4–6)
LH: 44.2 MIU/ML (ref 1.7–8.6)
SHBG SERPL-SCNC: 88 NMOL/L (ref 17–125)
TESTOST FREE SERPL-MCNC: ABNORMAL PG/ML (ref 0.6–3.8)
TESTOST SERPL-MCNC: <3 NG/DL (ref 3–41)
VITAMIN D 25-HYDROXY: 37.8 NG/ML (ref 30–100)

## 2025-08-18 ENCOUNTER — OFFICE VISIT (OUTPATIENT)
Dept: PRIMARY CARE CLINIC | Age: 55
End: 2025-08-18
Payer: COMMERCIAL

## 2025-08-18 VITALS
HEIGHT: 67 IN | OXYGEN SATURATION: 98 % | HEART RATE: 74 BPM | DIASTOLIC BLOOD PRESSURE: 68 MMHG | WEIGHT: 150.4 LBS | BODY MASS INDEX: 23.61 KG/M2 | SYSTOLIC BLOOD PRESSURE: 110 MMHG

## 2025-08-18 DIAGNOSIS — Z01.419 ENCOUNTER FOR GYNECOLOGICAL EXAMINATION: ICD-10-CM

## 2025-08-18 DIAGNOSIS — Z01.419 ENCOUNTER FOR GYNECOLOGICAL EXAMINATION: Primary | ICD-10-CM

## 2025-08-18 LAB — PROGEST SERPL-MCNC: <0.1 NG/ML

## 2025-08-18 PROCEDURE — 99396 PREV VISIT EST AGE 40-64: CPT | Performed by: INTERNAL MEDICINE

## 2025-08-18 ASSESSMENT — ENCOUNTER SYMPTOMS
WHEEZING: 0
NAUSEA: 0
VOMITING: 0
SHORTNESS OF BREATH: 0
ABDOMINAL PAIN: 0

## 2025-08-19 ENCOUNTER — RESULTS FOLLOW-UP (OUTPATIENT)
Dept: PRIMARY CARE CLINIC | Age: 55
End: 2025-08-19

## 2025-08-19 DIAGNOSIS — E03.9 ACQUIRED HYPOTHYROIDISM: Primary | ICD-10-CM

## 2025-08-19 DIAGNOSIS — R61 NIGHT SWEATS: ICD-10-CM

## 2025-08-19 LAB
ESTRADIOL SERPL HS-MCNC: 4 PG/ML
ESTROGEN SERPL CALC-MCNC: 14.8 PG/ML
ESTRONE SERPL-MCNC: 10.8 PG/ML

## 2025-08-19 RX ORDER — LEVOTHYROXINE SODIUM 137 UG/1
137 TABLET ORAL DAILY
Qty: 30 TABLET | Refills: 5 | Status: SHIPPED | OUTPATIENT
Start: 2025-08-19

## 2025-08-20 ENCOUNTER — ANESTHESIA EVENT (OUTPATIENT)
Dept: ENDOSCOPY | Age: 55
End: 2025-08-20
Payer: COMMERCIAL

## 2025-08-21 ENCOUNTER — ANESTHESIA (OUTPATIENT)
Dept: ENDOSCOPY | Age: 55
End: 2025-08-21
Payer: COMMERCIAL

## 2025-08-21 ENCOUNTER — HOSPITAL ENCOUNTER (OUTPATIENT)
Age: 55
Setting detail: OUTPATIENT SURGERY
Discharge: HOME | End: 2025-08-21
Attending: COLON & RECTAL SURGERY | Admitting: COLON & RECTAL SURGERY
Payer: COMMERCIAL

## 2025-08-21 VITALS
HEIGHT: 67 IN | DIASTOLIC BLOOD PRESSURE: 60 MMHG | HEART RATE: 81 BPM | RESPIRATION RATE: 18 BRPM | BODY MASS INDEX: 23.54 KG/M2 | WEIGHT: 150 LBS | TEMPERATURE: 98.3 F | SYSTOLIC BLOOD PRESSURE: 116 MMHG | OXYGEN SATURATION: 96 %

## 2025-08-21 DIAGNOSIS — Z12.11 COLON CANCER SCREENING: ICD-10-CM

## 2025-08-21 LAB
HPV HR 12 DNA SPEC QL NAA+PROBE: NOT DETECTED
HPV16 DNA SPEC QL NAA+PROBE: NOT DETECTED
HPV16+18+H RISK 12 DNA SPEC-IMP: NORMAL
HPV18 DNA SPEC QL NAA+PROBE: NOT DETECTED

## 2025-08-21 PROCEDURE — 3700000001 HC ADD 15 MINUTES (ANESTHESIA): Performed by: COLON & RECTAL SURGERY

## 2025-08-21 PROCEDURE — 2709999900 HC NON-CHARGEABLE SUPPLY: Performed by: COLON & RECTAL SURGERY

## 2025-08-21 PROCEDURE — 6360000002 HC RX W HCPCS: Performed by: REGISTERED NURSE

## 2025-08-21 PROCEDURE — 7100000011 HC PHASE II RECOVERY - ADDTL 15 MIN: Performed by: COLON & RECTAL SURGERY

## 2025-08-21 PROCEDURE — 7100000010 HC PHASE II RECOVERY - FIRST 15 MIN: Performed by: COLON & RECTAL SURGERY

## 2025-08-21 PROCEDURE — 3609027000 HC COLONOSCOPY: Performed by: COLON & RECTAL SURGERY

## 2025-08-21 PROCEDURE — 88305 TISSUE EXAM BY PATHOLOGIST: CPT

## 2025-08-21 PROCEDURE — 2500000003 HC RX 250 WO HCPCS: Performed by: COLON & RECTAL SURGERY

## 2025-08-21 PROCEDURE — 45380 COLONOSCOPY AND BIOPSY: CPT | Performed by: COLON & RECTAL SURGERY

## 2025-08-21 PROCEDURE — 3700000000 HC ANESTHESIA ATTENDED CARE: Performed by: COLON & RECTAL SURGERY

## 2025-08-21 RX ORDER — SODIUM CHLORIDE 9 MG/ML
INJECTION, SOLUTION INTRAVENOUS
Status: DISCONTINUED
Start: 2025-08-21 | End: 2025-08-21 | Stop reason: HOSPADM

## 2025-08-21 RX ORDER — PROPOFOL 10 MG/ML
INJECTION, EMULSION INTRAVENOUS
Status: DISCONTINUED | OUTPATIENT
Start: 2025-08-21 | End: 2025-08-21 | Stop reason: SDUPTHER

## 2025-08-21 RX ORDER — LIDOCAINE HYDROCHLORIDE 20 MG/ML
INJECTION, SOLUTION INFILTRATION; PERINEURAL
Status: DISCONTINUED | OUTPATIENT
Start: 2025-08-21 | End: 2025-08-21 | Stop reason: SDUPTHER

## 2025-08-21 RX ORDER — SODIUM CHLORIDE 0.9 % (FLUSH) 0.9 %
5-40 SYRINGE (ML) INJECTION PRN
Status: DISCONTINUED | OUTPATIENT
Start: 2025-08-21 | End: 2025-08-21 | Stop reason: HOSPADM

## 2025-08-21 RX ORDER — SODIUM CHLORIDE 9 MG/ML
25 INJECTION, SOLUTION INTRAVENOUS PRN
Status: DISCONTINUED | OUTPATIENT
Start: 2025-08-21 | End: 2025-08-21 | Stop reason: HOSPADM

## 2025-08-21 RX ORDER — SODIUM CHLORIDE 0.9 % (FLUSH) 0.9 %
5-40 SYRINGE (ML) INJECTION EVERY 12 HOURS SCHEDULED
Status: DISCONTINUED | OUTPATIENT
Start: 2025-08-21 | End: 2025-08-21 | Stop reason: HOSPADM

## 2025-08-21 RX ADMIN — PROPOFOL 50 MG: 10 INJECTION, EMULSION INTRAVENOUS at 08:38

## 2025-08-21 RX ADMIN — PROPOFOL 150 MG: 10 INJECTION, EMULSION INTRAVENOUS at 08:30

## 2025-08-21 RX ADMIN — LIDOCAINE HYDROCHLORIDE 60 MG: 20 INJECTION, SOLUTION INFILTRATION; PERINEURAL at 08:30

## 2025-08-21 RX ADMIN — PROPOFOL 50 MG: 10 INJECTION, EMULSION INTRAVENOUS at 08:35

## 2025-08-21 RX ADMIN — PROPOFOL 50 MG: 10 INJECTION, EMULSION INTRAVENOUS at 08:42

## 2025-08-21 ASSESSMENT — PAIN DESCRIPTION - DESCRIPTORS: DESCRIPTORS: CRAMPING

## 2025-08-21 ASSESSMENT — PAIN - FUNCTIONAL ASSESSMENT
PAIN_FUNCTIONAL_ASSESSMENT: 0-10
PAIN_FUNCTIONAL_ASSESSMENT: 0-10

## 2025-08-29 DIAGNOSIS — R79.89 ABNORMAL TSH: ICD-10-CM

## 2025-08-29 DIAGNOSIS — R79.89 ABNORMAL TSH: Primary | ICD-10-CM

## 2025-08-29 LAB
T4 FREE SERPL-MCNC: 1.59 NG/DL (ref 0.84–1.68)
TSH REFLEX: 0.04 UIU/ML (ref 0.44–3.86)

## 2025-09-02 ENCOUNTER — RESULTS FOLLOW-UP (OUTPATIENT)
Dept: PRIMARY CARE CLINIC | Age: 55
End: 2025-09-02

## (undated) DEVICE — NEPTUNE E-SEP SMOKE EVACUATION PENCIL, COATED, 70MM BLADE, PUSH BUTTON SWITCH: Brand: NEPTUNE E-SEP

## (undated) DEVICE — BRUSH ENDO CLN L90.5IN SHTH DIA1.7MM BRIST DIA5-7MM 2-6MM

## (undated) DEVICE — SPONGE,LAP,18"X18",DLX,XR,ST,5/PK,40/PK: Brand: MEDLINE

## (undated) DEVICE — SURGICAL PROCEDURE KIT ENDOSCP CUST 883 CARRY-ON

## (undated) DEVICE — FORCEPS BX L240CM JAW DIA2.8MM L CAP W/ NDL MIC MESH TOOTH

## (undated) DEVICE — SPONGE GZ W4XL4IN COT 12 PLY TYP VII WVN C FLD DSGN

## (undated) DEVICE — ZIMMER® STERILE DISPOSABLE TOURNIQUET CUFF, DUAL PORT, SINGLE BLADDER, 18 IN. (46 CM)

## (undated) DEVICE — MANIFOLD SUCT SMK EVAC SGL PRT DISP NEPTUNE 2

## (undated) DEVICE — SUTURE ETHLN SZ 4-0 L18IN NONABSORBABLE BLK L19MM PS-2 3/8 1667H

## (undated) DEVICE — SINGLE PORT MANIFOLD: Brand: NEPTUNE 2

## (undated) DEVICE — SUPPLEMENT DIGESTIVE H2O SOL GI-EASE

## (undated) DEVICE — TUBING SCTION CONN 1/4X10 RIB

## (undated) DEVICE — APPLICATOR MEDICATED 26 CC SOLUTION HI LT ORNG CHLORAPREP

## (undated) DEVICE — PADDING,UNDERCAST,COTTON, 4"X4YD STERILE: Brand: MEDLINE

## (undated) DEVICE — HAND II: Brand: MEDLINE INDUSTRIES, INC.

## (undated) DEVICE — 3M™ STERI-DRAPE™ U-DRAPE 1015: Brand: STERI-DRAPE™

## (undated) DEVICE — GOWN,SIRUS,POLYRNF,BRTHSLV,XLN/XL,20/CS: Brand: MEDLINE

## (undated) DEVICE — YANKAUER,SMOOTH HANDLE,HIGH CAPACITY: Brand: MEDLINE INDUSTRIES, INC.

## (undated) DEVICE — TUBING IRRIGATION 140/160/180/190 SER GI ENDOSCP SMARTCAP

## (undated) DEVICE — BANDAGE COBAN 4 IN COMPR W4INXL5YD FOAM COHESIVE QUIK STK SELF ADH SFT

## (undated) DEVICE — PAD,ABDOMINAL,8"X10",ST,LF: Brand: MEDLINE

## (undated) DEVICE — GLOVE SURG SZ 9 THK91MIL LTX FREE SYN POLYISOPRENE ANTI

## (undated) DEVICE — BANDAGE COMPR W4INXL5YD BGE HI E W/ REM CLP SURE-WRAP

## (undated) DEVICE — TUBE SET 96 MM 64 MM H2O PERISTALTIC STD AUX CHANNEL

## (undated) DEVICE — HEWSON SUTURE RETRIEVER: Brand: HEWSON SUTURE RETRIEVER

## (undated) DEVICE — DRESSING GZ W1XL8IN COT XRFRM N ADH OVERWRAP CURAD

## (undated) DEVICE — TUBING, SUCTION, 1/4" X 10', STRAIGHT: Brand: MEDLINE

## (undated) DEVICE — Device